# Patient Record
Sex: FEMALE | Race: WHITE | NOT HISPANIC OR LATINO | Employment: PART TIME | URBAN - METROPOLITAN AREA
[De-identification: names, ages, dates, MRNs, and addresses within clinical notes are randomized per-mention and may not be internally consistent; named-entity substitution may affect disease eponyms.]

---

## 2024-05-02 ENCOUNTER — OFFICE VISIT (OUTPATIENT)
Dept: INTERNAL MEDICINE | Facility: CLINIC | Age: 24
End: 2024-05-02
Payer: COMMERCIAL

## 2024-05-02 VITALS
HEART RATE: 112 BPM | TEMPERATURE: 98.3 F | SYSTOLIC BLOOD PRESSURE: 118 MMHG | HEIGHT: 66 IN | OXYGEN SATURATION: 97 % | BODY MASS INDEX: 27.48 KG/M2 | WEIGHT: 171 LBS | DIASTOLIC BLOOD PRESSURE: 80 MMHG

## 2024-05-02 DIAGNOSIS — R10.13 EPIGASTRIC PAIN: ICD-10-CM

## 2024-05-02 DIAGNOSIS — R10.11 RUQ ABDOMINAL PAIN: ICD-10-CM

## 2024-05-02 DIAGNOSIS — R19.7 DIARRHEA, UNSPECIFIED TYPE: ICD-10-CM

## 2024-05-02 DIAGNOSIS — Z72.0 VAPES NICOTINE CONTAINING SUBSTANCE: ICD-10-CM

## 2024-05-02 DIAGNOSIS — Z00.00 HEALTHCARE MAINTENANCE: Primary | ICD-10-CM

## 2024-05-02 DIAGNOSIS — F41.8 ANXIETY WITH DEPRESSION: ICD-10-CM

## 2024-05-02 DIAGNOSIS — R11.2 NAUSEA AND VOMITING, UNSPECIFIED VOMITING TYPE: ICD-10-CM

## 2024-05-02 PROBLEM — F90.2 ATTENTION DEFICIT HYPERACTIVITY DISORDER (ADHD), COMBINED TYPE: Status: ACTIVE | Noted: 2024-05-02

## 2024-05-02 PROBLEM — F41.9 ANXIETY: Status: ACTIVE | Noted: 2024-05-02

## 2024-05-02 PROCEDURE — 99385 PREV VISIT NEW AGE 18-39: CPT | Performed by: NURSE PRACTITIONER

## 2024-05-02 RX ORDER — DEXTROAMPHETAMINE SACCHARATE, AMPHETAMINE ASPARTATE MONOHYDRATE, DEXTROAMPHETAMINE SULFATE AND AMPHETAMINE SULFATE 5; 5; 5; 5 MG/1; MG/1; MG/1; MG/1
CAPSULE, EXTENDED RELEASE ORAL EVERY 12 HOURS SCHEDULED
COMMUNITY

## 2024-05-02 RX ORDER — LURASIDONE HYDROCHLORIDE 20 MG/1
1 TABLET, FILM COATED ORAL DAILY
COMMUNITY
Start: 2024-04-01

## 2024-05-02 RX ORDER — OMEPRAZOLE 40 MG/1
40 CAPSULE, DELAYED RELEASE ORAL DAILY
Qty: 30 CAPSULE | Refills: 1 | Status: SHIPPED | OUTPATIENT
Start: 2024-05-02

## 2024-05-02 RX ORDER — PROPRANOLOL HYDROCHLORIDE 10 MG/1
10 TABLET ORAL 2 TIMES DAILY PRN
Qty: 60 TABLET | Refills: 5 | Status: SHIPPED | OUTPATIENT
Start: 2024-05-02

## 2024-05-02 RX ORDER — ETONOGESTREL AND ETHINYL ESTRADIOL .015; .12 MG/D; MG/D
INSERT, EXTENDED RELEASE VAGINAL
COMMUNITY
Start: 2024-03-10

## 2024-05-02 RX ORDER — CETIRIZINE HYDROCHLORIDE 10 MG/1
1 TABLET ORAL DAILY
COMMUNITY

## 2024-05-02 NOTE — PROGRESS NOTES
Subjective   Bernarda Randall is a 23 y.o. female.     Chief Complaint   Patient presents with    Nausea     Pt is here as a new pt to est care. Pt c/o nausea, vomiting, diarrhea, gassy, bloated on an doff X 2 months.    Vomiting    Abdominal Pain    Anxiety    Depression        History of Present Illness   She is here today as a new patient to establish care and for CPE. She feels like her overall health is pretty good. She used to be very active, on her [employer] dance team. She notes more fatigue over the past few months.   Previous PCP was her pediatrician.     She notes over the past 2 months intermittent nausea, vomiting, diarrhea and abdominal pain and bloating. She notes epigastric burning sensation after eating and when her stomach is empty. She notes episodes of regurgitation shortly after eating and occasional episodes of vomiting, typically in the morning when she first gets up. She notes a lot of nausea in the morning. She denies any abdominal pain with eating, but notes early satiety. She has a difficult time eating a full meal. She notes abdominal bloating and frequent belching. She notes 7-10 episodes of loose stools a day with abdominal and rectal pain.  The pain improves after having a BM. Stool is described as bright yellow.  She notes a lot of fatigue.  She denies any fever, chills, BRBPR, melena, dysphagia, odynophagia, recent travel or antibiotic use. She has been using tums and Mylanta with mild improvement. She is unsure of any trigger foods. Positive family hx of mother with gallbladder disease.      Anxiety- she notes a lot of anxiety and depression recently with an increase in stress. She feels like she manages her anxiety and depression for the most part on her own. She notes anxiety is typically worse in the evening. She notes occasional palpitations and heart racing when anxiety is high. She has been using sleepy time tea and a weighted blanket. This helps some, but she still notes  anxiety. She has previously tried seroquel, lamictal and Zoloft.   ADHD-she is currently on Adderall XR 20 mg BID-TID and Latuda 20 mg nightly.  She is managed by psychiat[facility]va at the [facility] in [place].    GYN-she is followed by [facility]. She is currently on haloette vaginal ring.    The following portions of the patient's history were reviewed and updated as appropriate: allergies, current medications, past family history, past medical history, past social history, past surgical history, and problem list.    Review of Systems   Constitutional:  Positive for appetite change and fatigue. Negative for chills and fever.   HENT: Negative.     Eyes: Negative.    Respiratory: Negative.     Cardiovascular:  Positive for palpitations (occasional heart racing with anxiety). Negative for chest pain and leg swelling.   Gastrointestinal:  Positive for abdominal distention, abdominal pain, diarrhea, nausea, vomiting and GERD. Negative for blood in stool and constipation.   Endocrine: Negative.    Genitourinary: Negative.    Musculoskeletal: Negative.    Skin: Negative.    Allergic/Immunologic: Negative.    Neurological: Negative.    Hematological: Negative.    Psychiatric/Behavioral:  Positive for sleep disturbance and stress. Negative for suicidal ideas and depressed mood. The patient is nervous/anxious.        Objective   Physical Exam  Constitutional:       Appearance: Normal appearance. She is well-developed.   HENT:      Head: Normocephalic and atraumatic.      Right Ear: Hearing, tympanic membrane, ear canal and external ear normal.      Left Ear: Hearing, tympanic membrane, ear canal and external ear normal.      Nose: Nose normal.      Right Sinus: No maxillary sinus tenderness or frontal sinus tenderness.      Left Sinus: No maxillary sinus tenderness or frontal sinus tenderness.      Mouth/Throat:      Lips: Pink.      Mouth: Mucous membranes are moist.      Dentition: Normal dentition.      Tongue: No  lesions.      Pharynx: Oropharynx is clear. Uvula midline.      Tonsils: No tonsillar exudate.   Eyes:      General: Lids are normal.      Extraocular Movements: Extraocular movements intact.      Conjunctiva/sclera: Conjunctivae normal.      Pupils: Pupils are equal, round, and reactive to light.   Neck:      Thyroid: No thyroid mass, thyromegaly or thyroid tenderness.      Vascular: No carotid bruit.      Trachea: Trachea normal.   Cardiovascular:      Rate and Rhythm: Normal rate and regular rhythm.      Pulses: Normal pulses.           Radial pulses are 2+ on the right side and 2+ on the left side.        Popliteal pulses are 2+ on the right side and 2+ on the left side.        Dorsalis pedis pulses are 2+ on the right side and 2+ on the left side.        Posterior tibial pulses are 2+ on the right side and 2+ on the left side.      Heart sounds: S1 normal and S2 normal.   Pulmonary:      Effort: Pulmonary effort is normal.      Breath sounds: Normal breath sounds.   Abdominal:      General: Bowel sounds are normal. There is no distension or abdominal bruit.      Palpations: Abdomen is soft. There is no hepatomegaly or splenomegaly.      Tenderness: There is abdominal tenderness in the right upper quadrant and epigastric area. There is no guarding or rebound. Positive signs include Serrano's sign.      Hernia: No hernia is present.   Musculoskeletal:      Cervical back: Normal range of motion and neck supple.      Right lower leg: No edema.      Left lower leg: No edema.   Lymphadenopathy:      Head:      Right side of head: No submental, submandibular, tonsillar or occipital adenopathy.      Left side of head: No submental, submandibular, tonsillar or occipital adenopathy.      Cervical: No cervical adenopathy.      Upper Body:      Right upper body: No supraclavicular adenopathy.      Left upper body: No supraclavicular adenopathy.      Lower Body: No right inguinal adenopathy. No left inguinal adenopathy.    Skin:     General: Skin is warm and dry.      Findings: No rash.      Nails: There is no clubbing.   Neurological:      General: No focal deficit present.      Mental Status: She is alert and oriented to person, place, and time.      Cranial Nerves: Cranial nerves 2-12 are intact.      Sensory: Sensation is intact.      Motor: Motor function is intact.      Coordination: Coordination is intact.      Gait: Gait is intact.      Deep Tendon Reflexes:      Reflex Scores:       Patellar reflexes are 2+ on the right side and 2+ on the left side.  Psychiatric:         Attention and Perception: Attention and perception normal.         Mood and Affect: Affect normal. Mood is anxious.         Speech: Speech normal.         Behavior: Behavior normal. Behavior is cooperative.         Thought Content: Thought content normal.         Cognition and Memory: Cognition and memory normal.         Judgment: Judgment normal.         Vitals:    05/02/24 1456   BP: 118/80   Pulse: 112   Temp: 98.3 °F (36.8 °C)   SpO2: 97%      Body mass index is 27.6 kg/m².    Assessment & Plan   Problems Addressed this Visit       Anxiety    Relevant Medications    propranolol (INDERAL) 10 MG tablet    Vapes nicotine containing substance     Other Visit Diagnoses       Healthcare maintenance    -  Primary    Relevant Orders    CBC & Differential    Comprehensive Metabolic Panel    Hepatitis C Antibody    Lipid Panel With LDL / HDL Ratio    TSH Rfx On Abnormal To Free T4    Nausea and vomiting, unspecified vomiting type        Relevant Medications    omeprazole (priLOSEC) 40 MG capsule    Other Relevant Orders    Stool Culture (Reference Lab) - Stool, Per Rectum    Ova & Parasite Examination - Stool, Per Rectum    H. Pylori Antigen, Stool - Stool, Per Rectum    CBC & Differential    Comprehensive Metabolic Panel    US Gallbladder    Ambulatory Referral to Gastroenterology    Diarrhea, unspecified type        Relevant Orders    Stool Culture (Reference  Lab) - Stool, Per Rectum    Ova & Parasite Examination - Stool, Per Rectum    Calprotectin, Fecal - Stool, Per Rectum    H. Pylori Antigen, Stool - Stool, Per Rectum    CBC & Differential    Comprehensive Metabolic Panel    US Gallbladder    Ambulatory Referral to Gastroenterology    Epigastric pain        Relevant Medications    omeprazole (priLOSEC) 40 MG capsule    Other Relevant Orders    H. Pylori Antigen, Stool - Stool, Per Rectum    CBC & Differential    Comprehensive Metabolic Panel    Ambulatory Referral to Gastroenterology    RUQ abdominal pain        Relevant Orders    CBC & Differential    Comprehensive Metabolic Panel    US Gallbladder          Diagnoses         Codes Comments    Healthcare maintenance    -  Primary ICD-10-CM: Z00.00  ICD-9-CM: V70.0     Nausea and vomiting, unspecified vomiting type     ICD-10-CM: R11.2  ICD-9-CM: 787.01     Diarrhea, unspecified type     ICD-10-CM: R19.7  ICD-9-CM: 787.91     Epigastric pain     ICD-10-CM: R10.13  ICD-9-CM: 789.06     RUQ abdominal pain     ICD-10-CM: R10.11  ICD-9-CM: 789.01     Anxiety with depression     ICD-10-CM: F41.8  ICD-9-CM: 300.4     Vapes nicotine containing substance     ICD-10-CM: Z72.0  ICD-9-CM: 305.1         Patient screened positive for depression based on a PHQ-9 score of 16 on 5/2/2024. Follow-up recommendations include: Referral to Mental Health specialist.    1.  Preventative counseling-encouraged her to continue to work on healthy, balanced eating and return to regular exercise as tolerated.  Ensure adequate dietary take calcium vitamin D.  2.  Nausea and vomiting/diarrhea/right upper quadrant and epigastric pain-check stool studies today including stool culture, O&P and fecal calprotectin.  Check lab work on Monday.  Gallbladder ultrasound ordered for further evaluation. Start omeprazole 40 mg daily.  Discussed reflux precautions.  Encourage cessation of vaping. Referral placed to GI.  3.  Anxiety with depression-will try  propranolol 10 mg twice daily as needed for anxiety.  Encouraged relaxation techniques.  Follow-up in 6 weeks for medication check.  Encouraged her to follow-up with psychiatry as scheduled.  4.  ADHD-managed per psychiatry.  Encouraged her to follow-up as scheduled.  Monitor heart rate closely with stimulant medication.  “Discussed risks/benefits to vaccination, reviewed components of the vaccine, discussed VIS, discussed informed consent, informed consent obtained. Patient/Parent was allowed to accept or refuse vaccine. Questions answered to satisfactory state of patient/Parent. We reviewed typical age appropriate and seasonally appropriate vaccinations. Reviewed immunization history and updated state vaccination form as needed. Patient was counseled on HPV  Tdap    Encouraged routine dental and eye exams.  GYN-records requested today.  Encouraged sunscreen use outside    Fasting labs on Monday, will call with results.  Follow-up in 6 weeks for anxiety.

## 2024-05-03 ENCOUNTER — TELEPHONE (OUTPATIENT)
Dept: INTERNAL MEDICINE | Facility: CLINIC | Age: 24
End: 2024-05-03
Payer: COMMERCIAL

## 2024-05-03 NOTE — TELEPHONE ENCOUNTER
----- Message from Amy LUND sent at 5/2/2024  3:45 PM EDT -----  Can we call [facility] to request her most recent Pap smear record?

## 2024-05-08 ENCOUNTER — HOSPITAL ENCOUNTER (OUTPATIENT)
Facility: HOSPITAL | Age: 24
Discharge: HOME | End: 2024-05-08
Admitting: NURSE PRACTITIONER
Payer: COMMERCIAL

## 2024-05-08 DIAGNOSIS — R11.2 NAUSEA AND VOMITING, UNSPECIFIED VOMITING TYPE: ICD-10-CM

## 2024-05-08 DIAGNOSIS — R10.11 RUQ ABDOMINAL PAIN: ICD-10-CM

## 2024-05-08 DIAGNOSIS — R11.2 NAUSEA AND VOMITING, UNSPECIFIED VOMITING TYPE: Primary | ICD-10-CM

## 2024-05-08 DIAGNOSIS — E80.6 HYPERBILIRUBINEMIA: ICD-10-CM

## 2024-05-08 DIAGNOSIS — R19.7 DIARRHEA, UNSPECIFIED TYPE: ICD-10-CM

## 2024-05-08 DIAGNOSIS — R17 ELEVATED BILIRUBIN: ICD-10-CM

## 2024-05-08 DIAGNOSIS — R74.8 ELEVATED LIVER ENZYMES: ICD-10-CM

## 2024-05-08 DIAGNOSIS — R74.01 TRANSAMINITIS: Primary | ICD-10-CM

## 2024-05-08 DIAGNOSIS — K76.0 HEPATIC STEATOSIS: ICD-10-CM

## 2024-05-08 PROCEDURE — 76705 ECHO EXAM OF ABDOMEN: CPT

## 2024-05-15 ENCOUNTER — OFFICE VISIT (OUTPATIENT)
Dept: GASTROENTEROLOGY | Facility: CLINIC | Age: 24
End: 2024-05-15
Payer: COMMERCIAL

## 2024-05-15 VITALS
BODY MASS INDEX: 27.48 KG/M2 | HEART RATE: 101 BPM | OXYGEN SATURATION: 100 % | HEIGHT: 66 IN | WEIGHT: 171 LBS | SYSTOLIC BLOOD PRESSURE: 135 MMHG | DIASTOLIC BLOOD PRESSURE: 80 MMHG | TEMPERATURE: 96.4 F

## 2024-05-15 DIAGNOSIS — R74.8 ELEVATED LIVER ENZYMES: ICD-10-CM

## 2024-05-15 DIAGNOSIS — R10.13 EPIGASTRIC PAIN: Primary | ICD-10-CM

## 2024-05-15 DIAGNOSIS — R19.7 DIARRHEA, UNSPECIFIED TYPE: ICD-10-CM

## 2024-05-15 DIAGNOSIS — R11.0 NAUSEA: ICD-10-CM

## 2024-05-15 RX ORDER — DIPHENHYDRAMINE HCL 25 MG
25 TABLET ORAL NIGHTLY
COMMUNITY

## 2024-05-15 NOTE — PROGRESS NOTES
Chief Complaint   Patient presents with    Nausea    Diarrhea    Abdominal Pain           History of Present Illness    Patient is a 24 y.o. who presents to the office as a new patient for further evaluation of nausea, vomiting, diarrhea after referral received from primary care provider AUGUSTINE Arshad. Patient has a significant past medical history of electronic cigarette use, anxiety and ADHD    She denies prior EGD or colonoscopy evaluation.    States that over the past few months she has experienced worsening upper abdominal pain described as a burning sensation and diarrhea.  States that she started omeprazole as prescribed by primary care provider with subsequent improvement in epigastric abdominal pain and resolution of diarrhea.    Current bowel habits are described as occurring twice per day compared to up to 10 loose stools per day prior to starting omeprazole.  Denies presence of melena or hematochezia.  Denies nocturnal bowel movements.    Prior to 5/2 assessment of LFTs with PCP she reports taking increased amounts of Midol for lower abdominal cramping associated with menses.  Denies previous knowledge of liver changes.  Reports unknown liver disease in grandfather.     Denies use of alcohol however does reports increased use as a teenager.    Patient has positive family history of colon polyps in mother and father    Patient denies known family history of colon cancer, or IBD.       Result Review :       Office Visit with Gloria Cuellar APRN (05/02/2024)   Comprehensive Metabolic Panel (05/06/2024 11:02)   Hepatic Function Panel (05/10/2024 13:54) - LFTs improved  Hepatitis Panel, Acute (05/10/2024 13:54)negative  LIGIA Direct Reflex to 11 Biomarker (05/10/2024 13:54)negative  Ferritin (05/10/2024 13:54)  Anti-Smooth Muscle Antibody Titer (05/10/2024 13:54)negative  Gamma GT (05/10/2024 13:54)  Mitochondrial Antibodies, M2 (05/10/2024 13:54)negative  Alpha - 1 - Antitrypsin (05/10/2024 13:54)-  "negative  US Gallbladder (05/08/2024 13:10)   Hepatic steatosis otherwise negative for evidence of acute cholecystitis or biliary ductal dilation  CT Abdomen & Pelvis W IV Contrast (09/01/2022 19:49)  CT Abdomen & Pelvis W IV Contrast (10/14/2022 22:18)  Lipid Panel With LDL / HDL Ratio (05/06/2024 11:02) -  Triglycerides 234  Vital Signs:   /80   Pulse 101   Temp 96.4 °F (35.8 °C)   Ht 167.6 cm (65.98\")   Wt 77.6 kg (171 lb)   SpO2 100%   BMI 27.61 kg/m²     Body mass index is 27.61 kg/m².     Physical Exam  Vitals reviewed.   Constitutional:       General: She is not in acute distress.     Appearance: Normal appearance. She is well-developed. She is not diaphoretic.   HENT:      Head: Normocephalic and atraumatic.   Eyes:      General: No scleral icterus.  Cardiovascular:      Rate and Rhythm: Normal rate and regular rhythm.   Pulmonary:      Effort: Pulmonary effort is normal. No respiratory distress.      Breath sounds: Normal breath sounds.   Abdominal:      General: Bowel sounds are normal. There is no distension.      Palpations: Abdomen is soft. There is no mass.      Tenderness: There is no abdominal tenderness. There is no guarding or rebound.      Hernia: No hernia is present.   Skin:     General: Skin is warm and dry.      Coloration: Skin is not jaundiced.   Neurological:      Mental Status: She is alert and oriented to person, place, and time.   Psychiatric:         Mood and Affect: Mood normal.         Behavior: Behavior normal.         Thought Content: Thought content normal.         Judgment: Judgment normal.           Assessment and Plan    Diagnoses and all orders for this visit:    1. Epigastric pain (Primary)    2. Nausea    3. Diarrhea, unspecified type    4. Elevated liver enzymes  -     Hepatic Function Panel; Future           Discussion:    Patient is a pleasant 24-year-old female who presents today for further evaluation of epigastric abdominal pain, diarrhea, and elevated liver " enzymes.  Discussed future assessment options including proceeding with EGD evaluation to assess for evidence of EGD, gastritis, PUD, or pyloric stenosis versus continuing on omeprazole x 12 weeks with plans to begin every other day dosing x 14 days and if symptoms return at this time would recommend proceeding with EGD evaluation.    Patient would like to proceed with option to and continue omeprazole until August and if symptoms return after discontinuing therapy she will contact her office to proceed with EGD evaluation at that time.    For elevated liver enzymes, these were improved at time of subsequent assessment.  We will recheck in 4 weeks and if normalized no further assessment is required.  However if they remain elevated would recommend proceeding with MRCP assessment to look for underlying etiology.    I have also provided recommendations for fatty liver changes noted on ultrasound assessment and encouraged her to avoid any nonprescribed supplements, limit Tylenol and NSAID use, and alcohol to reduce risk of liver injury.    Patient is agreeable to the outlined above treatment plan.  Verbalizes understanding and will contact office for any new or worsening concerns.  All questions answered and support provided.        Patient Instructions   Recheck liver enzymes around Yocasta 10, 2024, if your liver enzymes remain elevated at this time would recommend completing an MRI imaging study of your liver to look for an underlying cause.     Please continue to take Tylenol less than 2 g/day, avoid nonprescribed supplements, and limit NSAID use when possible such as Excedrin    For GERD:    Follow antireflux precautions:    Continue taking the omeprazole 40 mg once daily prior to first meal of the day for a total of 12 weeks.  On or around August 2, 2024 I would like for you to begin taking your omeprazole every other day x 14 days.    If your upper abdominal pain and/or nausea returns I would like for you to let  our office know so that we can schedule you for a procedure called EGD where week will take a camera while you are asleep.  During the EGD we will look for any visible cause to your symptoms such as inflammation in the stomach.  Avoiding eating within 3 to 4 hours of bedtime.    Avoid foods that can trigger symptoms which may include:  citrus fruits  spicy foods,  Tomatoes  Red sauces   Chocolate  coffee/tea  caffeinated or carbonated beverages  alcohol    For Fatty Liver Changes visible on ultrasound:     We recommend lifestyle modifications including Mediterranean diet.  Research has proven that reducing your body weight by at least 10% over 12 months has led to significant improvements in liver health  If safe to do so we recommend exercising for at least 150 minutes/week or increase your activity level by greater than 60 minutes/week  At this time there are unfortunately no proven medications to help improve fatty liver changes however managing your other health concerns such as lowering high cholesterol and blood sugars can reduce risk of future liver injury and prevent progression of liver disease.  We also recommend continued monitoring of liver function test with either our office or with primary care provider every 3 to 6 months for monitoring            EMR Dragon/Transcription Disclaimer:  This document has been Dictated utilizing Dragon dictation.

## 2024-05-15 NOTE — PATIENT INSTRUCTIONS
Recheck liver enzymes around Yocasta 10, 2024, if your liver enzymes remain elevated at this time would recommend completing an MRI imaging study of your liver to look for an underlying cause.     Please continue to take Tylenol less than 2 g/day, avoid nonprescribed supplements, and limit NSAID use when possible such as Excedrin    For GERD:    Follow antireflux precautions:    Continue taking the omeprazole 40 mg once daily prior to first meal of the day for a total of 12 weeks.  On or around August 2, 2024 I would like for you to begin taking your omeprazole every other day x 14 days.    If your upper abdominal pain and/or nausea returns I would like for you to let our office know so that we can schedule you for a procedure called EGD where week will take a camera while you are asleep.  During the EGD we will look for any visible cause to your symptoms such as inflammation in the stomach.  Avoiding eating within 3 to 4 hours of bedtime.    Avoid foods that can trigger symptoms which may include:  citrus fruits  spicy foods,  Tomatoes  Red sauces   Chocolate  coffee/tea  caffeinated or carbonated beverages  alcohol    For Fatty Liver Changes visible on ultrasound:     We recommend lifestyle modifications including Mediterranean diet.  Research has proven that reducing your body weight by at least 10% over 12 months has led to significant improvements in liver health  If safe to do so we recommend exercising for at least 150 minutes/week or increase your activity level by greater than 60 minutes/week  At this time there are unfortunately no proven medications to help improve fatty liver changes however managing your other health concerns such as lowering high cholesterol and blood sugars can reduce risk of future liver injury and prevent progression of liver disease.  We also recommend continued monitoring of liver function test with either our office or with primary care provider every 3 to 6 months for  monitoring

## 2024-05-18 LAB
BACTERIA SPEC CULT: NORMAL
BACTERIA SPEC CULT: NORMAL
CALPROTECTIN STL-MCNT: 141 UG/G (ref 0–120)
CAMPYLOBACTER STL CULT: NORMAL
E COLI SXT STL QL IA: NEGATIVE
H PYLORI AG STL QL IA: NEGATIVE
O+P SPEC MICRO: NORMAL
O+P STL CONC: NORMAL
SALM + SHIG STL CULT: NORMAL

## 2024-05-24 ENCOUNTER — PREP FOR SURGERY (OUTPATIENT)
Dept: SURGERY | Facility: SURGERY CENTER | Age: 24
End: 2024-05-24
Payer: COMMERCIAL

## 2024-05-24 DIAGNOSIS — R11.0 NAUSEA: ICD-10-CM

## 2024-05-24 DIAGNOSIS — R10.11 RUQ ABDOMINAL PAIN: ICD-10-CM

## 2024-05-24 DIAGNOSIS — R10.13 EPIGASTRIC PAIN: Primary | ICD-10-CM

## 2024-05-24 DIAGNOSIS — R19.7 DIARRHEA, UNSPECIFIED TYPE: ICD-10-CM

## 2024-05-24 RX ORDER — SODIUM CHLORIDE 0.9 % (FLUSH) 0.9 %
10 SYRINGE (ML) INJECTION AS NEEDED
OUTPATIENT
Start: 2024-05-24

## 2024-05-24 RX ORDER — SODIUM CHLORIDE, SODIUM LACTATE, POTASSIUM CHLORIDE, CALCIUM CHLORIDE 600; 310; 30; 20 MG/100ML; MG/100ML; MG/100ML; MG/100ML
30 INJECTION, SOLUTION INTRAVENOUS CONTINUOUS PRN
OUTPATIENT
Start: 2024-05-24

## 2024-05-24 RX ORDER — SODIUM CHLORIDE 0.9 % (FLUSH) 0.9 %
3 SYRINGE (ML) INJECTION EVERY 12 HOURS SCHEDULED
OUTPATIENT
Start: 2024-05-24

## 2024-07-17 DIAGNOSIS — R10.13 EPIGASTRIC PAIN: ICD-10-CM

## 2024-07-17 DIAGNOSIS — R11.2 NAUSEA AND VOMITING, UNSPECIFIED VOMITING TYPE: ICD-10-CM

## 2024-07-17 RX ORDER — OMEPRAZOLE 40 MG/1
40 CAPSULE, DELAYED RELEASE ORAL DAILY
Qty: 30 CAPSULE | Refills: 1 | Status: SHIPPED | OUTPATIENT
Start: 2024-07-17

## 2024-07-28 ENCOUNTER — ON CAMPUS - OUTPATIENT (OUTPATIENT)
Dept: URBAN - METROPOLITAN AREA HOSPITAL 114 | Facility: HOSPITAL | Age: 24
End: 2024-07-28
Payer: COMMERCIAL

## 2024-07-28 ENCOUNTER — HOSPITAL ENCOUNTER (OUTPATIENT)
Facility: HOSPITAL | Age: 24
Setting detail: OBSERVATION
Discharge: HOME | End: 2024-07-30
Attending: EMERGENCY MEDICINE | Admitting: INTERNAL MEDICINE
Payer: COMMERCIAL

## 2024-07-28 ENCOUNTER — APPOINTMENT (OUTPATIENT)
Dept: CT IMAGING | Facility: HOSPITAL | Age: 24
End: 2024-07-28
Payer: COMMERCIAL

## 2024-07-28 DIAGNOSIS — K92.2 UPPER GI BLEED: Primary | ICD-10-CM

## 2024-07-28 DIAGNOSIS — K70.10 ALCOHOLIC HEPATITIS WITHOUT ASCITES: ICD-10-CM

## 2024-07-28 DIAGNOSIS — F10.10 ALCOHOL ABUSE: ICD-10-CM

## 2024-07-28 DIAGNOSIS — K92.0 HEMATEMESIS: ICD-10-CM

## 2024-07-28 DIAGNOSIS — R10.13 EPIGASTRIC PAIN: ICD-10-CM

## 2024-07-28 PROBLEM — R79.89 ABNORMAL LFTS: Status: ACTIVE | Noted: 2024-07-28

## 2024-07-28 PROBLEM — K76.0 FATTY LIVER: Status: ACTIVE | Noted: 2024-07-28

## 2024-07-28 PROBLEM — K29.20 ALCOHOLIC GASTRITIS: Status: ACTIVE | Noted: 2024-07-28

## 2024-07-28 LAB
ALBUMIN SERPL-MCNC: 3.8 G/DL (ref 3.5–5.2)
ALBUMIN/GLOB SERPL: 1.5 G/DL
ALP SERPL-CCNC: 97 U/L (ref 39–117)
ALT SERPL W P-5'-P-CCNC: 81 U/L (ref 1–33)
ANION GAP SERPL CALCULATED.3IONS-SCNC: 11.5 MMOL/L (ref 5–15)
AST SERPL-CCNC: 193 U/L (ref 1–32)
BASOPHILS # BLD AUTO: 0.03 10*3/MM3 (ref 0–0.2)
BASOPHILS NFR BLD AUTO: 0.5 % (ref 0–1.5)
BILIRUB SERPL-MCNC: 1.8 MG/DL (ref 0–1.2)
BILIRUB UR QL STRIP: NEGATIVE
BUN SERPL-MCNC: 2 MG/DL (ref 6–20)
BUN/CREAT SERPL: 3.2 (ref 7–25)
CALCIUM SPEC-SCNC: 8.6 MG/DL (ref 8.6–10.5)
CHLORIDE SERPL-SCNC: 96 MMOL/L (ref 98–107)
CLARITY UR: ABNORMAL
CO2 SERPL-SCNC: 25.5 MMOL/L (ref 22–29)
COLOR UR: YELLOW
CREAT SERPL-MCNC: 0.63 MG/DL (ref 0.57–1)
D-LACTATE SERPL-SCNC: 1.9 MMOL/L (ref 0.5–2)
D-LACTATE SERPL-SCNC: 2.2 MMOL/L (ref 0.5–2)
D-LACTATE SERPL-SCNC: 4.5 MMOL/L (ref 0.5–2)
DEPRECATED RDW RBC AUTO: 62.2 FL (ref 37–54)
EGFRCR SERPLBLD CKD-EPI 2021: 128 ML/MIN/1.73
EOSINOPHIL # BLD AUTO: 0.01 10*3/MM3 (ref 0–0.4)
EOSINOPHIL NFR BLD AUTO: 0.2 % (ref 0.3–6.2)
ERYTHROCYTE [DISTWIDTH] IN BLOOD BY AUTOMATED COUNT: 16 % (ref 12.3–15.4)
ETHANOL BLD-MCNC: 68 MG/DL (ref 0–10)
ETHANOL UR QL: 0.07 %
GLOBULIN UR ELPH-MCNC: 2.5 GM/DL
GLUCOSE SERPL-MCNC: 99 MG/DL (ref 65–99)
GLUCOSE UR STRIP-MCNC: NEGATIVE MG/DL
HCG SERPL QL: NEGATIVE
HCT VFR BLD AUTO: 38.2 % (ref 34–46.6)
HCT VFR BLD AUTO: 44.3 % (ref 34–46.6)
HGB BLD-MCNC: 13.1 G/DL (ref 12–15.9)
HGB BLD-MCNC: 15.1 G/DL (ref 12–15.9)
HGB UR QL STRIP.AUTO: NEGATIVE
IMM GRANULOCYTES # BLD AUTO: 0.01 10*3/MM3 (ref 0–0.05)
IMM GRANULOCYTES NFR BLD AUTO: 0.2 % (ref 0–0.5)
KETONES UR QL STRIP: NEGATIVE
LEUKOCYTE ESTERASE UR QL STRIP.AUTO: NEGATIVE
LIPASE SERPL-CCNC: 141 U/L (ref 13–60)
LYMPHOCYTES # BLD AUTO: 0.74 10*3/MM3 (ref 0.7–3.1)
LYMPHOCYTES NFR BLD AUTO: 12.1 % (ref 19.6–45.3)
MCH RBC QN AUTO: 35.4 PG (ref 26.6–33)
MCHC RBC AUTO-ENTMCNC: 34.1 G/DL (ref 31.5–35.7)
MCV RBC AUTO: 103.7 FL (ref 79–97)
MONOCYTES # BLD AUTO: 0.56 10*3/MM3 (ref 0.1–0.9)
MONOCYTES NFR BLD AUTO: 9.2 % (ref 5–12)
NEUTROPHILS NFR BLD AUTO: 4.76 10*3/MM3 (ref 1.7–7)
NEUTROPHILS NFR BLD AUTO: 77.8 % (ref 42.7–76)
NITRITE UR QL STRIP: NEGATIVE
PH UR STRIP.AUTO: 6 [PH] (ref 5–8)
PLATELET # BLD AUTO: 276 10*3/MM3 (ref 140–450)
PMV BLD AUTO: 9.6 FL (ref 6–12)
POTASSIUM SERPL-SCNC: 4.3 MMOL/L (ref 3.5–5.2)
PROT SERPL-MCNC: 6.3 G/DL (ref 6–8.5)
PROT UR QL STRIP: NEGATIVE
RBC # BLD AUTO: 4.27 10*6/MM3 (ref 3.77–5.28)
SODIUM SERPL-SCNC: 133 MMOL/L (ref 136–145)
SP GR UR STRIP: <=1.005 (ref 1–1.03)
UROBILINOGEN UR QL STRIP: ABNORMAL
WBC NRBC COR # BLD AUTO: 6.11 10*3/MM3 (ref 3.4–10.8)

## 2024-07-28 PROCEDURE — 25510000001 IOPAMIDOL PER 1 ML: Performed by: EMERGENCY MEDICINE

## 2024-07-28 PROCEDURE — 25810000003 SODIUM CHLORIDE 0.9 % SOLUTION: Performed by: INTERNAL MEDICINE

## 2024-07-28 PROCEDURE — 25010000002 DROPERIDOL PER 5 MG: Performed by: PHYSICIAN ASSISTANT

## 2024-07-28 PROCEDURE — 96361 HYDRATE IV INFUSION ADD-ON: CPT

## 2024-07-28 PROCEDURE — 25010000002 THIAMINE HCL 200 MG/2ML SOLUTION: Performed by: INTERNAL MEDICINE

## 2024-07-28 PROCEDURE — 99285 EMERGENCY DEPT VISIT HI MDM: CPT | Performed by: PHYSICIAN ASSISTANT

## 2024-07-28 PROCEDURE — 25010000002 ONDANSETRON PER 1 MG: Performed by: PHYSICIAN ASSISTANT

## 2024-07-28 PROCEDURE — G0378 HOSPITAL OBSERVATION PER HR: HCPCS

## 2024-07-28 PROCEDURE — 83690 ASSAY OF LIPASE: CPT | Performed by: PHYSICIAN ASSISTANT

## 2024-07-28 PROCEDURE — 99285 EMERGENCY DEPT VISIT HI MDM: CPT

## 2024-07-28 PROCEDURE — 83605 ASSAY OF LACTIC ACID: CPT | Performed by: PHYSICIAN ASSISTANT

## 2024-07-28 PROCEDURE — 80053 COMPREHEN METABOLIC PANEL: CPT | Performed by: PHYSICIAN ASSISTANT

## 2024-07-28 PROCEDURE — 96375 TX/PRO/DX INJ NEW DRUG ADDON: CPT

## 2024-07-28 PROCEDURE — 36415 COLL VENOUS BLD VENIPUNCTURE: CPT

## 2024-07-28 PROCEDURE — 84703 CHORIONIC GONADOTROPIN ASSAY: CPT | Performed by: PHYSICIAN ASSISTANT

## 2024-07-28 PROCEDURE — 25010000002 MORPHINE PER 10 MG: Performed by: PHYSICIAN ASSISTANT

## 2024-07-28 PROCEDURE — 25810000003 SODIUM CHLORIDE 0.9 % SOLUTION: Performed by: PHYSICIAN ASSISTANT

## 2024-07-28 PROCEDURE — 82077 ASSAY SPEC XCP UR&BREATH IA: CPT | Performed by: PHYSICIAN ASSISTANT

## 2024-07-28 PROCEDURE — 96374 THER/PROPH/DIAG INJ IV PUSH: CPT

## 2024-07-28 PROCEDURE — 85025 COMPLETE CBC W/AUTO DIFF WBC: CPT | Performed by: PHYSICIAN ASSISTANT

## 2024-07-28 PROCEDURE — 85014 HEMATOCRIT: CPT | Performed by: INTERNAL MEDICINE

## 2024-07-28 PROCEDURE — 99222 1ST HOSP IP/OBS MODERATE 55: CPT | Performed by: INTERNAL MEDICINE

## 2024-07-28 PROCEDURE — 96376 TX/PRO/DX INJ SAME DRUG ADON: CPT

## 2024-07-28 PROCEDURE — 74177 CT ABD & PELVIS W/CONTRAST: CPT

## 2024-07-28 PROCEDURE — 85018 HEMOGLOBIN: CPT | Performed by: INTERNAL MEDICINE

## 2024-07-28 PROCEDURE — 81003 URINALYSIS AUTO W/O SCOPE: CPT | Performed by: EMERGENCY MEDICINE

## 2024-07-28 RX ORDER — LORAZEPAM 1 MG/1
2 TABLET ORAL EVERY 6 HOURS
Status: COMPLETED | OUTPATIENT
Start: 2024-07-28 | End: 2024-07-29

## 2024-07-28 RX ORDER — ONDANSETRON 2 MG/ML
4 INJECTION INTRAMUSCULAR; INTRAVENOUS EVERY 6 HOURS PRN
Status: DISCONTINUED | OUTPATIENT
Start: 2024-07-28 | End: 2024-07-30 | Stop reason: HOSPADM

## 2024-07-28 RX ORDER — THIAMINE HYDROCHLORIDE 100 MG/ML
100 INJECTION, SOLUTION INTRAMUSCULAR; INTRAVENOUS ONCE
Status: COMPLETED | OUTPATIENT
Start: 2024-07-28 | End: 2024-07-28

## 2024-07-28 RX ORDER — LORAZEPAM 2 MG/ML
1 INJECTION INTRAMUSCULAR
Status: DISCONTINUED | OUTPATIENT
Start: 2024-07-28 | End: 2024-07-30 | Stop reason: HOSPADM

## 2024-07-28 RX ORDER — SODIUM CHLORIDE 0.9 % (FLUSH) 0.9 %
10 SYRINGE (ML) INJECTION AS NEEDED
Status: DISCONTINUED | OUTPATIENT
Start: 2024-07-28 | End: 2024-07-30

## 2024-07-28 RX ORDER — LORAZEPAM 2 MG/ML
2 INJECTION INTRAMUSCULAR
Status: DISCONTINUED | OUTPATIENT
Start: 2024-07-28 | End: 2024-07-30 | Stop reason: HOSPADM

## 2024-07-28 RX ORDER — PANTOPRAZOLE SODIUM 40 MG/10ML
40 INJECTION, POWDER, LYOPHILIZED, FOR SOLUTION INTRAVENOUS EVERY 12 HOURS SCHEDULED
Status: DISCONTINUED | OUTPATIENT
Start: 2024-07-28 | End: 2024-07-30

## 2024-07-28 RX ORDER — MORPHINE SULFATE 2 MG/ML
4 INJECTION, SOLUTION INTRAMUSCULAR; INTRAVENOUS EVERY 4 HOURS PRN
Status: DISCONTINUED | OUTPATIENT
Start: 2024-07-28 | End: 2024-07-30 | Stop reason: HOSPADM

## 2024-07-28 RX ORDER — SODIUM CHLORIDE 0.9 % (FLUSH) 0.9 %
10 SYRINGE (ML) INJECTION EVERY 12 HOURS SCHEDULED
Status: DISCONTINUED | OUTPATIENT
Start: 2024-07-28 | End: 2024-07-30

## 2024-07-28 RX ORDER — BISACODYL 10 MG
10 SUPPOSITORY, RECTAL RECTAL DAILY PRN
Status: DISCONTINUED | OUTPATIENT
Start: 2024-07-28 | End: 2024-07-30 | Stop reason: HOSPADM

## 2024-07-28 RX ORDER — DROPERIDOL 2.5 MG/ML
2.5 INJECTION, SOLUTION INTRAMUSCULAR; INTRAVENOUS ONCE
Status: COMPLETED | OUTPATIENT
Start: 2024-07-28 | End: 2024-07-28

## 2024-07-28 RX ORDER — SODIUM CHLORIDE 9 MG/ML
125 INJECTION, SOLUTION INTRAVENOUS CONTINUOUS
Status: DISCONTINUED | OUTPATIENT
Start: 2024-07-28 | End: 2024-07-30

## 2024-07-28 RX ORDER — POLYETHYLENE GLYCOL 3350 17 G/17G
17 POWDER, FOR SOLUTION ORAL DAILY PRN
Status: DISCONTINUED | OUTPATIENT
Start: 2024-07-28 | End: 2024-07-30 | Stop reason: HOSPADM

## 2024-07-28 RX ORDER — THIAMINE HYDROCHLORIDE 100 MG/ML
200 INJECTION, SOLUTION INTRAMUSCULAR; INTRAVENOUS EVERY 8 HOURS SCHEDULED
Status: DISCONTINUED | OUTPATIENT
Start: 2024-07-28 | End: 2024-07-30 | Stop reason: HOSPADM

## 2024-07-28 RX ORDER — AMOXICILLIN 250 MG
2 CAPSULE ORAL 2 TIMES DAILY PRN
Status: DISCONTINUED | OUTPATIENT
Start: 2024-07-28 | End: 2024-07-30 | Stop reason: HOSPADM

## 2024-07-28 RX ORDER — LORAZEPAM 1 MG/1
2 TABLET ORAL
Status: DISCONTINUED | OUTPATIENT
Start: 2024-07-28 | End: 2024-07-30 | Stop reason: HOSPADM

## 2024-07-28 RX ORDER — NITROGLYCERIN 0.4 MG/1
0.4 TABLET SUBLINGUAL
Status: DISCONTINUED | OUTPATIENT
Start: 2024-07-28 | End: 2024-07-30 | Stop reason: HOSPADM

## 2024-07-28 RX ORDER — LORAZEPAM 1 MG/1
1 TABLET ORAL EVERY 6 HOURS
Status: COMPLETED | OUTPATIENT
Start: 2024-07-29 | End: 2024-07-30

## 2024-07-28 RX ORDER — LORAZEPAM 1 MG/1
1 TABLET ORAL DAILY
Status: DISCONTINUED | OUTPATIENT
Start: 2024-08-01 | End: 2024-07-30 | Stop reason: HOSPADM

## 2024-07-28 RX ORDER — LORAZEPAM 1 MG/1
1 TABLET ORAL
Status: DISCONTINUED | OUTPATIENT
Start: 2024-07-28 | End: 2024-07-30 | Stop reason: HOSPADM

## 2024-07-28 RX ORDER — DEXTROAMPHETAMINE SACCHARATE, AMPHETAMINE ASPARTATE MONOHYDRATE, DEXTROAMPHETAMINE SULFATE AND AMPHETAMINE SULFATE 2.5; 2.5; 2.5; 2.5 MG/1; MG/1; MG/1; MG/1
20 CAPSULE, EXTENDED RELEASE ORAL EVERY 12 HOURS
Status: DISCONTINUED | OUTPATIENT
Start: 2024-07-28 | End: 2024-07-30 | Stop reason: HOSPADM

## 2024-07-28 RX ORDER — MORPHINE SULFATE 4 MG/ML
4 INJECTION, SOLUTION INTRAMUSCULAR; INTRAVENOUS ONCE
Status: COMPLETED | OUTPATIENT
Start: 2024-07-28 | End: 2024-07-28

## 2024-07-28 RX ORDER — NICOTINE 21 MG/24HR
1 PATCH, TRANSDERMAL 24 HOURS TRANSDERMAL EVERY 24 HOURS
Status: DISCONTINUED | OUTPATIENT
Start: 2024-07-28 | End: 2024-07-30 | Stop reason: HOSPADM

## 2024-07-28 RX ORDER — BISACODYL 5 MG/1
5 TABLET, DELAYED RELEASE ORAL DAILY PRN
Status: DISCONTINUED | OUTPATIENT
Start: 2024-07-28 | End: 2024-07-30 | Stop reason: HOSPADM

## 2024-07-28 RX ORDER — PANTOPRAZOLE SODIUM 40 MG/10ML
80 INJECTION, POWDER, LYOPHILIZED, FOR SOLUTION INTRAVENOUS ONCE
Status: COMPLETED | OUTPATIENT
Start: 2024-07-28 | End: 2024-07-28

## 2024-07-28 RX ORDER — LORAZEPAM 2 MG/ML
1 INJECTION INTRAMUSCULAR ONCE
Status: DISCONTINUED | OUTPATIENT
Start: 2024-07-28 | End: 2024-07-28

## 2024-07-28 RX ORDER — LORAZEPAM 1 MG/1
1 TABLET ORAL EVERY 12 HOURS SCHEDULED
Status: DISCONTINUED | OUTPATIENT
Start: 2024-07-30 | End: 2024-07-30 | Stop reason: HOSPADM

## 2024-07-28 RX ORDER — ONDANSETRON 2 MG/ML
4 INJECTION INTRAMUSCULAR; INTRAVENOUS ONCE
Status: COMPLETED | OUTPATIENT
Start: 2024-07-28 | End: 2024-07-28

## 2024-07-28 RX ORDER — PROPRANOLOL HYDROCHLORIDE 10 MG/1
10 TABLET ORAL 2 TIMES DAILY PRN
Status: DISCONTINUED | OUTPATIENT
Start: 2024-07-28 | End: 2024-07-29

## 2024-07-28 RX ORDER — FOLIC ACID 1 MG/1
1 TABLET ORAL DAILY
Status: DISCONTINUED | OUTPATIENT
Start: 2024-07-28 | End: 2024-07-30 | Stop reason: HOSPADM

## 2024-07-28 RX ADMIN — SODIUM CHLORIDE 1000 ML: 9 INJECTION, SOLUTION INTRAVENOUS at 11:24

## 2024-07-28 RX ADMIN — PANTOPRAZOLE SODIUM 40 MG: 40 INJECTION, POWDER, FOR SOLUTION INTRAVENOUS at 21:02

## 2024-07-28 RX ADMIN — LORAZEPAM 2 MG: 1 TABLET ORAL at 17:24

## 2024-07-28 RX ADMIN — SODIUM CHLORIDE 1000 ML: 9 INJECTION, SOLUTION INTRAVENOUS at 13:24

## 2024-07-28 RX ADMIN — DROPERIDOL 2.5 MG: 2.5 INJECTION, SOLUTION INTRAMUSCULAR; INTRAVENOUS at 13:24

## 2024-07-28 RX ADMIN — FOLIC ACID 1 MG: 1 TABLET ORAL at 21:03

## 2024-07-28 RX ADMIN — Medication 10 ML: at 21:02

## 2024-07-28 RX ADMIN — THIAMINE HYDROCHLORIDE 100 MG: 100 INJECTION, SOLUTION INTRAMUSCULAR; INTRAVENOUS at 21:01

## 2024-07-28 RX ADMIN — ONDANSETRON 4 MG: 2 INJECTION INTRAMUSCULAR; INTRAVENOUS at 11:17

## 2024-07-28 RX ADMIN — MORPHINE SULFATE 4 MG: 4 INJECTION, SOLUTION INTRAMUSCULAR; INTRAVENOUS at 11:20

## 2024-07-28 RX ADMIN — SODIUM CHLORIDE 125 ML/HR: 9 INJECTION, SOLUTION INTRAVENOUS at 17:25

## 2024-07-28 RX ADMIN — PANTOPRAZOLE SODIUM 80 MG: 40 INJECTION, POWDER, FOR SOLUTION INTRAVENOUS at 11:13

## 2024-07-28 RX ADMIN — IOPAMIDOL 85 ML: 755 INJECTION, SOLUTION INTRAVENOUS at 11:56

## 2024-07-28 NOTE — CONSULTS
[facility]   Consult Note    Patient Name: Bernarda Randall  : 2000  MRN: [id number]  Primary Care Physician:  Gloria Cuellar APRN  Referring Physician: AUGUSTINE Arshad  Date of admission: 2024    Inpatient Gastroenterology Consult  Consult performed by: Mahad Beyer MD  Consult ordered by: Maria Verdin MD        Subjective   Subjective     Reason for Consult/ Chief Complaint: hematemesis    History of Present Illness  Bernarda Randall is a 24 y.o. female presented with hematemesis.  She is an alcoholic and has been drinking lately.  She had vomiting and in fact did eventually vomit blood. Her parents are with her this evening.  She denies black or bloody stools.  She has had in the past chronic diarrhea,  A fecal calprotectin was elevated in may and she was supposed to get upper and lower tract scopes but did not do so. She states the diarrhea is improved.  She  has poor eye contact during history     Review of Systems   Gastrointestinal:  Positive for nausea and vomiting.   Neurological:  Positive for weakness.   Psychiatric/Behavioral:  Positive for dysphoric mood.         Personal History     Past Medical History:   Diagnosis Date    ADHD (attention deficit hyperactivity disorder)     Anxiety     Depression        Past Surgical History:   Procedure Laterality Date    EAR TUBES         Family History: family history includes Breast cancer in her maternal great-grandmother; Cancer in her maternal aunt; Colon polyps in her father and mother; Diabetes in her father; Endometriosis in her mother; Heart attack in her maternal aunt; Heart disease in her maternal grandfather; Hyperlipidemia in her mother; Hypertension in her mother; Liver disease in her maternal grandmother. Otherwise pertinent FHx was reviewed and not pertinent to current issue.    Social History:  reports that she has never smoked. She has never used smokeless tobacco. She reports current alcohol use. She reports  that she does not use drugs.    Home Medications:   amphetamine-dextroamphetamine XR, etonogestrel-ethinyl estradiol, omeprazole, and propranolol    Allergies:  Allergies   Allergen Reactions    Nuts Nausea Only    Peanut-Containing Drug Products Hives       Objective    Objective     Vitals:  Temp:  [98.8 °F (37.1 °C)-100.4 °F (38 °C)] 99.7 °F (37.6 °C)  Heart Rate:  [] 132  Resp:  [16-18] 16  BP: (128-143)/() 141/87    Physical Exam  Constitutional:       Appearance: She is ill-appearing.   HENT:      Right Ear: External ear normal.      Left Ear: External ear normal.      Mouth/Throat:      Pharynx: Oropharynx is clear.   Eyes:      Conjunctiva/sclera: Conjunctivae normal.   Cardiovascular:      Rate and Rhythm: Normal rate.      Pulses: Normal pulses.   Pulmonary:      Effort: Pulmonary effort is normal.   Abdominal:      General: Abdomen is flat.      Palpations: There is hepatomegaly.      Tenderness: There is abdominal tenderness in the right upper quadrant.   Skin:     General: Skin is warm and dry.   Neurological:      Mental Status: She is alert.         Result Review    Result Review:  I have personally reviewed the results from the time of this admission to 7/28/2024 18:20 EDT and agree with these findings:  []  Laboratory list / accordion  []  Microbiology  []  Radiology  []  EKG/Telemetry   []  Cardiology/Vascular   []  Pathology  []  Old records  []  Other:  Most notable findings include:       Assessment & Plan   Assessment / Plan     Brief Patient Summary:  Bernarda Randall is a 24 y.o. female who   Hematemesis suspect abdulaziz monreal tear, or alcholic gastritis  Alcoholic hepatitis  Epigastric pain     Active Hospital Problems:  Active Hospital Problems    Diagnosis     **Upper GI bleed     Alcohol abuse     Alcoholic gastritis     Fatty liver     Abnormal LFTs     Epigastric pain     Nausea     Attention deficit hyperactivity disorder (ADHD), combined type     Vapes nicotine  containing substance      Plan: agree with pantoprazole infusion  Plan for upper endoscopy with Dr Murphy tomorrow  Discussed with patient and family at length about resources for parents and patient for support in her reaching sobriety.  Suggested for patient AA and shannan lennon for parents  [facility] takes over care tomorrow       Mahad Beyer MD

## 2024-07-28 NOTE — H&P
Internal medicine history and physical  INTERNAL MEDICINE   [facility]       Patient Identification:  Name: Bernarda Randall  Age: 24 y.o.  Sex: female  :  2000  MRN: [id number]                   Primary Care Physician: Gloria Cuellar APRN                               Date of admission:2024    Chief Complaint: Nausea vomiting and abdominal pain since early this morning.    History of Present Illness:   Patient is a 24-year-old female with history of ADHD, anxiety, depression and fatty liver disease and admits to alcohol and did not quantify clearly how much she drinks but she states she drinks a lot and has been dealing with off-and-on abdominal discomfort with vomiting.  Despite this she has been drinking on a regular basis and claims that her last drink was last night.  She went to bed feeling sick at her stomach woke up this morning with pain and discomfort in her upper abdomen associated with vomiting and was concerned that she may have vomited dark material and possibly blood.  Patient denies any other symptoms such as fever or chills or dizzy spells or passing any blood in the stool or change in the color of her stool.  With these complaints she went to emergency room at [facility] where initial evaluation revealed her to be tachycardiac, mildly elevated liver function test lipase of 141 and lactic acid level of 4.5.  Patient apparently has been followed with gastroenterologist and has been given medication for acid reflux but she is not taking it.  Blood alcohol level was reported to be 68.  CT scan of the abdomen and pelvis did not reveal any acute intra-abdominal process except for known fatty infiltration of the liver.  Patient is being admitted for further care.      Past Medical History:  Past Medical History:   Diagnosis Date    ADHD (attention deficit hyperactivity disorder)     Anxiety     Depression      Past Surgical History:  Past Surgical History:  "  Procedure Laterality Date    EAR TUBES        Home Meds:  (Not in a hospital admission)    Current Meds:     Current Facility-Administered Medications:     LORazepam (ATIVAN) injection 1 mg, 1 mg, Intravenous, Once, Theresa Romo PA-C    Current Outpatient Medications:     amphetamine-dextroamphetamine XR (Adderall XR) 20 MG 24 hr capsule, Every 12 (Twelve) Hours, Disp: , Rfl:     Haloette 0.12-0.015 MG/24HR vaginal ring, , Disp: , Rfl:     omeprazole (priLOSEC) 40 MG capsule, TAKE 1 CAPSULE BY MOUTH DAILY, Disp: 30 capsule, Rfl: 1    propranolol (INDERAL) 10 MG tablet, Take 1 tablet by mouth 2 (Two) Times a Day As Needed (anxiety)., Disp: 60 tablet, Rfl: 5  Allergies:  Allergies   Allergen Reactions    Nuts Nausea Only     Social History:   Social History     Tobacco Use    Smoking status: Never    Smokeless tobacco: Never   Substance Use Topics    Alcohol use: Yes     Comment: on weekends      Family History:  Family History   Problem Relation Age of Onset    Colon polyps Mother     Hypertension Mother     Hyperlipidemia Mother     Endometriosis Mother     Colon polyps Father     Diabetes Father     Cancer Maternal Aunt     Heart attack Maternal Aunt     Liver disease Maternal Grandmother     Heart disease Maternal Grandfather     Breast cancer Maternal Great-Grandmother     Colon cancer Neg Hx     Crohn's disease Neg Hx     Irritable bowel syndrome Neg Hx     Ulcerative colitis Neg Hx           Review of Systems  See history of present illness and past medical history.   As described in history of presenting illness    Vitals:   /80 (BP Location: Left arm, Patient Position: Lying)   Pulse 111   Temp 100.4 °F (38 °C) (Oral)   Resp 18   Ht 167.6 cm (66\")   Wt 72.6 kg (160 lb)   LMP 05/30/2024 (Approximate)   SpO2 96%   BMI 25.82 kg/m²   I/O:   Intake/Output Summary (Last 24 hours) at 7/28/2024 1617  Last data filed at 7/28/2024 1610  Gross per 24 hour   Intake 2000 ml   Output --   Net 2000 ml "     Exam:  Patient is examined using the personal protective equipment as per guidelines from infection control for this particular patient as enacted.  Hand washing was performed before and after patient interaction.  General Appearance:  Alert cooperative nontoxic-appearing young female who appears well-nourished   Head:    Normocephalic, without obvious abnormality, atraumatic   Eyes:    PERRL, conjunctiva/corneas clear, EOM's intact, both eyes   Ears:    Normal external ear canals, both ears   Nose:   Nares normal, septum midline, mucosa normal, no drainage    or sinus tenderness   Throat:   Lips, tongue, gums normal; oral mucosa pink and moist   Neck: Supple and no adenopathy   Back:     Symmetric, no curvature, ROM normal, no CVA tenderness   Lungs:     Clear to auscultation bilaterally, respirations unlabored   Chest Wall:    No tenderness or deformity    Heart:  S1-S2 tachycardic   Abdomen:   Soft epigastric tenderness no guarding rigidity or rebound noted   Extremities:   Extremities normal, atraumatic, no cyanosis or edema   Pulses:   Pulses palpable in all extremities; symmetric all extremities   Skin:   Skin color normal, Skin is warm and dry,  no rashes or palpable lesions   Neurologic: Alert and oriented x 3 grossly nonfocal examination       Data Review:      I reviewed the patient's new clinical results.  Results from last 7 days   Lab Units 07/28/24  1107   WBC 10*3/mm3 6.11   HEMOGLOBIN g/dL 15.1   PLATELETS 10*3/mm3 276     Results from last 7 days   Lab Units 07/28/24  1153   SODIUM mmol/L 133*   POTASSIUM mmol/L 4.3   CHLORIDE mmol/L 96*   CO2 mmol/L 25.5   BUN mg/dL 2*   CREATININE mg/dL 0.63   CALCIUM mg/dL 8.6   GLUCOSE mg/dL 99     CT Abdomen Pelvis With Contrast    Result Date: 7/28/2024  1. Severe fatty infiltration of the liver. 2. No other significant findings are noted.   Radiation dose reduction techniques were utilized, including automated exposure control and exposure modulation  based on body size.   This report was finalized on 7/28/2024 12:49 PM by Dr. Xander Up M.D on Workstation: [id number]     Microbiology Results (last 10 days)       ** No results found for the last 240 hours. **          Telemetry Scan   Final Result      Telemetry Scan   Final Result      Telemetry Scan   Final Result          Assessment:  Active Hospital Problems    Diagnosis  POA    **Upper GI bleed [K92.2]  Yes    Alcohol abuse [F10.10]  Unknown    Alcoholic gastritis [K29.20]  Unknown    Fatty liver [K76.0]  Unknown    Abnormal LFTs [R79.89]  Unknown    Epigastric pain [R10.13]  Yes    Nausea [R11.0]  Yes    Attention deficit hyperactivity disorder (ADHD), combined type [F90.2]  Yes    Vapes nicotine containing substance [Z72.0]  Yes       Medical decision making/CARE plan: See admitting orders  Nausea vomiting abdominal pain with coffee-ground emesis in the setting of significant alcohol use and mildly elevated LFTs and lipase with negative CT scan of the abdomen and pelvis-this likely represent acute alcoholic gastritis versus pancreatitis versus combination of both-plan is to admit the patient continue with IV Protonix, keep her n.p.o. except for ice chips and medications and check serial H&H.  Gastroenterology consultation and further management as her condition evolves.  Provide symptomatic relief of pain and nausea.  Abnormal LFTs-multifactorial including underlying probable fatty liver disease and ongoing use of alcohol-monitor liver function and avoid hepatotoxic agents.  Alcohol abuse with blood alcohol level of 68 and last drink reported last night-patient is at risk of alcohol withdrawal-placed on CIWA protocol, replace thiamine and folic acid provided with cautious hydration and monitor electrolytes and replace per protocol.  Nicotine vaping-provided with nicotine patch and smoking cessation counseling.  Attention deficit hyperactivity disorder-continue home regimen and monitor.    Jawed  MD Lambert   7/28/2024  16:17 EDT    Parts of this note may be an electronic transcription/translation of spoken language to printed text using the Dragon dictation system.

## 2024-07-28 NOTE — FSED PROVIDER NOTE
Subjective   History of Present Illness  Patient is a 24-year-old female with a history of alcohol abuse and fatty liver disease.  She has chronic flareups of epigastric pain with vomiting.  Patient says she is supposed to take a stomach pill but she cannot recall what is called and does not take it.  She has seen GI specialist and they want to set her up for colonoscopy and EGD but this has not been done yet.    She says she has seen blood in the vomit.  No chance of pregnancy.      Review of Systems   Constitutional: Negative.    HENT: Negative.     Respiratory: Negative.     Cardiovascular: Negative.    Gastrointestinal:  Positive for abdominal pain, nausea and vomiting (blood).   Genitourinary: Negative.    Musculoskeletal: Negative.    Neurological: Negative.    Psychiatric/Behavioral: Negative.     All other systems reviewed and are negative.      Past Medical History:   Diagnosis Date    ADHD (attention deficit hyperactivity disorder)     Anxiety     Depression        Allergies   Allergen Reactions    Nuts Nausea Only       Past Surgical History:   Procedure Laterality Date    EAR TUBES         Family History   Problem Relation Age of Onset    Colon polyps Mother     Hypertension Mother     Hyperlipidemia Mother     Endometriosis Mother     Colon polyps Father     Diabetes Father     Cancer Maternal Aunt     Heart attack Maternal Aunt     Liver disease Maternal Grandmother     Heart disease Maternal Grandfather     Breast cancer Maternal Great-Grandmother     Colon cancer Neg Hx     Crohn's disease Neg Hx     Irritable bowel syndrome Neg Hx     Ulcerative colitis Neg Hx        Social History     Socioeconomic History    Marital status: Single   Tobacco Use    Smoking status: Never    Smokeless tobacco: Never   Vaping Use    Vaping status: Every Day    Substances: Nicotine    Devices: Disposable   Substance and Sexual Activity    Alcohol use: Yes     Comment: on weekends    Drug use: Never    Sexual activity:  Not Currently           Objective   Physical Exam  Vitals reviewed.   Constitutional:       Appearance: She is well-developed.   Cardiovascular:      Rate and Rhythm: Tachycardia present.   Pulmonary:      Effort: Pulmonary effort is normal.      Breath sounds: Normal breath sounds.   Abdominal:      Palpations: Abdomen is soft.      Tenderness: There is abdominal tenderness in the right upper quadrant, epigastric area and left upper quadrant. There is no right CVA tenderness, left CVA tenderness or guarding. Negative signs include Serrano's sign and McBurney's sign.   Skin:     General: Skin is warm and dry.   Neurological:      General: No focal deficit present.   Psychiatric:         Mood and Affect: Mood normal.         Behavior: Behavior normal.         Procedures           ED Course                                           Medical Decision Making  Patient has been actively vomiting/dry heaving.  There is some stomach contents that has a pinkish-brown discoloration consistent with coffee-ground emesis.  In looking at her chart she does have a history of fatty liver.  she says her GI problems stem from alcohol.  She is tachycardic with heart rate around 115.    She is tachycardic and given fluids.  I also gave Protonix, Zofran, morphine.  She continued to be nauseated and we added droperidol.  She has elevated lactic acid of 4.5 which came down to 2.2 after fluids.  Normal white count.  There is some elevation in bilirubin of 1.8,  and ALT of 81.  Alk phos normal.  CT is concerning for severe fatty liver.    I spoke with Dr. Verdin and he accepts patient.  I did speak with Dr. Beyer for GI and he will be consulted.  Patient is no longer vomiting at this time there are still Concerns for the bleeding and she will be admitted.  Mother will take her directly to [facility].    Amount and/or Complexity of Data Reviewed  Labs: ordered.  Radiology: ordered.    Risk  Prescription drug management.  Decision  regarding hospitalization.        Final diagnoses:   Upper GI bleed   Alcohol abuse       ED Disposition  ED Disposition       ED Disposition   Decision to Admit    Condition   --    Comment   Level of Care: Telemetry [5]   Diagnosis: Upper GI bleed [347169]   Admitting Physician: COREY RODRIGUEZ [[id number]]   Attending Physician: COREY RODRIGUEZ [[id number]]                 No follow-up provider specified.       Medication List        Stop      Benadryl Allergy 25 MG tablet  Generic drug: diphenhydrAMINE     cetirizine 10 MG tablet  Commonly known as: zyrTEC     fluticasone 50 MCG/ACT nasal spray  Commonly known as: FLONASE     Lurasidone HCl 20 MG tablet tablet  Commonly known as: LATUDA     phenazopyridine 200 MG tablet  Commonly known as: PYRIDIUM

## 2024-07-28 NOTE — ED NOTES
Nursing report ED to floor  Bernarda Randall  24 y.o.  female    HPI :  HPI (Adult)  Stated Reason for Visit: abd pain and vomitting started this morning    Chief Complaint  Chief Complaint   Patient presents with    Abdominal Pain    Vomiting       Admitting doctor:   Maria Verdin MD    Admitting diagnosis:   There were no encounter diagnoses.    Code status:   Current Code Status       Date Active Code Status Order ID Comments User Context       Not on file            Allergies:   Nuts    Isolation:   No active isolations    Intake and Output    Intake/Output Summary (Last 24 hours) at 7/28/2024 1548  Last data filed at 7/28/2024 1321  Gross per 24 hour   Intake 1000 ml   Output --   Net 1000 ml       Weight:       07/28/24  1040   Weight: 72.6 kg (160 lb)       Most recent vitals:   Vitals:    07/28/24 1330 07/28/24 1400 07/28/24 1421 07/28/24 1530   BP: 140/84 128/87  129/83   Pulse: 93 95 101 115   Resp:    18   Temp:       SpO2: 100% 100% 100% 97%   Weight:       Height:           Active LDAs/IV Access:   Lines, Drains & Airways       Active LDAs       Name Placement date Placement time Site Days    Peripheral IV 07/28/24 1108 Right Antecubital 07/28/24  1108  Antecubital  less than 1                    Labs (abnormal labs have a star):   Labs Reviewed   COMPREHENSIVE METABOLIC PANEL - Abnormal; Notable for the following components:       Result Value    BUN 2 (*)     Sodium 133 (*)     Chloride 96 (*)     ALT (SGPT) 81 (*)     AST (SGOT) 193 (*)     Total Bilirubin 1.8 (*)     BUN/Creatinine Ratio 3.2 (*)     All other components within normal limits    Narrative:     GFR Normal >60  Chronic Kidney Disease <60  Kidney Failure <15     LIPASE - Abnormal; Notable for the following components:    Lipase 141 (*)     All other components within normal limits   LACTIC ACID, PLASMA - Abnormal; Notable for the following components:    Lactate 4.5 (*)     All other components within normal limits   CBC WITH AUTO  DIFFERENTIAL - Abnormal; Notable for the following components:    .7 (*)     MCH 35.4 (*)     RDW 16.0 (*)     RDW-SD 62.2 (*)     Neutrophil % 77.8 (*)     Lymphocyte % 12.1 (*)     Eosinophil % 0.2 (*)     All other components within normal limits   ETHANOL - Abnormal; Notable for the following components:    Ethanol 68 (*)     All other components within normal limits   LACTIC ACID, REFLEX - Abnormal; Notable for the following components:    Lactate 2.2 (*)     All other components within normal limits   URINALYSIS WITHOUT MICROSCOPIC (NO CULTURE) - Abnormal; Notable for the following components:    Appearance, UA Slightly Cloudy (*)     All other components within normal limits   HCG, SERUM, QUALITATIVE - Normal   LACTIC ACID, REFLEX   CBC AND DIFFERENTIAL    Narrative:     The following orders were created for panel order CBC & Differential.  Procedure                               Abnormality         Status                     ---------                               -----------         ------                     CBC Auto Differential[[id number]]        Abnormal            Final result                 Please view results for these tests on the individual orders.       EKG:   No orders to display       Meds given in ED:   Medications   LORazepam (ATIVAN) injection 1 mg (0 mg Intravenous Hold 7/28/24 1148)   Morphine sulfate (PF) injection 4 mg (4 mg Intravenous Given 7/28/24 1120)   ondansetron (ZOFRAN) injection 4 mg (4 mg Intravenous Given 7/28/24 1117)   sodium chloride 0.9 % bolus 1,000 mL (0 mL Intravenous Stopped 7/28/24 1321)   pantoprazole (PROTONIX) injection 80 mg (80 mg Intravenous Given 7/28/24 1113)   iopamidol (ISOVUE-370) 76 % injection 100 mL (85 mL Intravenous Given 7/28/24 1156)   droperidol (INAPSINE) injection 2.5 mg (2.5 mg Intravenous Given 7/28/24 1324)   sodium chloride 0.9 % bolus 1,000 mL (1,000 mL Intravenous New Bag 7/28/24 1324)       Imaging results:  CT Abdomen Pelvis With  Contrast    Result Date: 7/28/2024  1. Severe fatty infiltration of the liver. 2. No other significant findings are noted.   Radiation dose reduction techniques were utilized, including automated exposure control and exposure modulation based on body size.   This report was finalized on 7/28/2024 12:49 PM by Dr. Xander Up M.D on Workstation: [id number]       Ambulatory status:   Partial assist    Social issues:   Social History     Socioeconomic History    Marital status: Single   Tobacco Use    Smoking status: Never    Smokeless tobacco: Never   Vaping Use    Vaping status: Every Day    Substances: Nicotine    Devices: Disposable   Substance and Sexual Activity    Alcohol use: Yes     Comment: on weekends    Drug use: Never    Sexual activity: Not Currently       Peripheral Neurovascular  Peripheral Neurovascular (Adult)  Peripheral Neurovascular WDL: WDL    Neuro Cognitive  Neuro Cognitive (Adult)  Cognitive/Neuro/Behavioral WDL: WDL    Learning  Learning Assessment (Adult)  Learning Readiness and Ability: no barriers identified    Respiratory  Respiratory (Adult)  Airway WDL: WDL  Respiratory WDL  Respiratory WDL: WDL    Abdominal Pain       Pain Assessments  Pain (Adult)  (0-10) Pain Rating: Rest: 8    NIH Stroke Scale       Lauren Jenkins RN  07/28/24 15:48 EDT

## 2024-07-28 NOTE — Clinical Note
Level of Care: Telemetry [5]   Diagnosis: Upper GI bleed [954694]   Admitting Physician: COREY RODRIGUEZ [[id number]]   Attending Physician: COREY RODRIGUEZ [[id number]]   Isolate for COVID?: No [0]   Certification: I Certify That Inpatient Hospital Services Are Medically Necessary For Greater Than 2 Midnights  
1

## 2024-07-29 ENCOUNTER — APPOINTMENT (OUTPATIENT)
Dept: GENERAL RADIOLOGY | Facility: HOSPITAL | Age: 24
End: 2024-07-29
Payer: COMMERCIAL

## 2024-07-29 ENCOUNTER — ANESTHESIA (OUTPATIENT)
Dept: GASTROENTEROLOGY | Facility: HOSPITAL | Age: 24
End: 2024-07-29
Payer: COMMERCIAL

## 2024-07-29 ENCOUNTER — ANESTHESIA EVENT (OUTPATIENT)
Dept: GASTROENTEROLOGY | Facility: HOSPITAL | Age: 24
End: 2024-07-29
Payer: COMMERCIAL

## 2024-07-29 PROBLEM — U07.1 COVID-19 VIRUS INFECTION: Status: ACTIVE | Noted: 2024-07-29

## 2024-07-29 PROBLEM — K70.10 ALCOHOLIC HEPATITIS: Status: ACTIVE | Noted: 2024-07-28

## 2024-07-29 LAB
ALBUMIN SERPL-MCNC: 3.3 G/DL (ref 3.5–5.2)
ALBUMIN/GLOB SERPL: 1.3 G/DL
ALP SERPL-CCNC: 85 U/L (ref 39–117)
ALT SERPL W P-5'-P-CCNC: 64 U/L (ref 1–33)
ANION GAP SERPL CALCULATED.3IONS-SCNC: 11.4 MMOL/L (ref 5–15)
AST SERPL-CCNC: 134 U/L (ref 1–32)
B PARAPERT DNA SPEC QL NAA+PROBE: NOT DETECTED
B PERT DNA SPEC QL NAA+PROBE: NOT DETECTED
BASOPHILS # BLD AUTO: 0.02 10*3/MM3 (ref 0–0.2)
BASOPHILS NFR BLD AUTO: 0.6 % (ref 0–1.5)
BILIRUB SERPL-MCNC: 2.3 MG/DL (ref 0–1.2)
BUN SERPL-MCNC: 3 MG/DL (ref 6–20)
BUN/CREAT SERPL: 4.4 (ref 7–25)
C PNEUM DNA NPH QL NAA+NON-PROBE: NOT DETECTED
CALCIUM SPEC-SCNC: 8.2 MG/DL (ref 8.6–10.5)
CHLORIDE SERPL-SCNC: 101 MMOL/L (ref 98–107)
CO2 SERPL-SCNC: 22.6 MMOL/L (ref 22–29)
CREAT SERPL-MCNC: 0.68 MG/DL (ref 0.57–1)
D-LACTATE SERPL-SCNC: 0.6 MMOL/L (ref 0.5–2)
DEPRECATED RDW RBC AUTO: 66 FL (ref 37–54)
EGFRCR SERPLBLD CKD-EPI 2021: 125.7 ML/MIN/1.73
EOSINOPHIL # BLD AUTO: 0.01 10*3/MM3 (ref 0–0.4)
EOSINOPHIL NFR BLD AUTO: 0.3 % (ref 0.3–6.2)
ERYTHROCYTE [DISTWIDTH] IN BLOOD BY AUTOMATED COUNT: 17.6 % (ref 12.3–15.4)
FLUAV SUBTYP SPEC NAA+PROBE: NOT DETECTED
FLUBV RNA ISLT QL NAA+PROBE: NOT DETECTED
GLOBULIN UR ELPH-MCNC: 2.5 GM/DL
GLUCOSE SERPL-MCNC: 79 MG/DL (ref 65–99)
HADV DNA SPEC NAA+PROBE: NOT DETECTED
HCOV 229E RNA SPEC QL NAA+PROBE: NOT DETECTED
HCOV HKU1 RNA SPEC QL NAA+PROBE: NOT DETECTED
HCOV NL63 RNA SPEC QL NAA+PROBE: NOT DETECTED
HCOV OC43 RNA SPEC QL NAA+PROBE: NOT DETECTED
HCT VFR BLD AUTO: 34.4 % (ref 34–46.6)
HCT VFR BLD AUTO: 38.6 % (ref 34–46.6)
HGB BLD-MCNC: 11.8 G/DL (ref 12–15.9)
HGB BLD-MCNC: 13.3 G/DL (ref 12–15.9)
HMPV RNA NPH QL NAA+NON-PROBE: NOT DETECTED
HPIV1 RNA ISLT QL NAA+PROBE: NOT DETECTED
HPIV2 RNA SPEC QL NAA+PROBE: NOT DETECTED
HPIV3 RNA NPH QL NAA+PROBE: NOT DETECTED
HPIV4 P GENE NPH QL NAA+PROBE: NOT DETECTED
IMM GRANULOCYTES # BLD AUTO: 0.01 10*3/MM3 (ref 0–0.05)
IMM GRANULOCYTES NFR BLD AUTO: 0.3 % (ref 0–0.5)
LYMPHOCYTES # BLD AUTO: 0.96 10*3/MM3 (ref 0.7–3.1)
LYMPHOCYTES NFR BLD AUTO: 28.6 % (ref 19.6–45.3)
M PNEUMO IGG SER IA-ACNC: NOT DETECTED
MAGNESIUM SERPL-MCNC: 1.5 MG/DL (ref 1.6–2.6)
MCH RBC QN AUTO: 35.3 PG (ref 26.6–33)
MCHC RBC AUTO-ENTMCNC: 34.3 G/DL (ref 31.5–35.7)
MCV RBC AUTO: 103 FL (ref 79–97)
MONOCYTES # BLD AUTO: 0.55 10*3/MM3 (ref 0.1–0.9)
MONOCYTES NFR BLD AUTO: 16.4 % (ref 5–12)
NEUTROPHILS NFR BLD AUTO: 1.81 10*3/MM3 (ref 1.7–7)
NEUTROPHILS NFR BLD AUTO: 53.8 % (ref 42.7–76)
NRBC BLD AUTO-RTO: 0 /100 WBC (ref 0–0.2)
PHOSPHATE SERPL-MCNC: 4.2 MG/DL (ref 2.5–4.5)
PLATELET # BLD AUTO: 175 10*3/MM3 (ref 140–450)
PMV BLD AUTO: 9.6 FL (ref 6–12)
POTASSIUM SERPL-SCNC: 3.6 MMOL/L (ref 3.5–5.2)
POTASSIUM SERPL-SCNC: 4.5 MMOL/L (ref 3.5–5.2)
PROCALCITONIN SERPL-MCNC: 0.13 NG/ML (ref 0–0.25)
PROT SERPL-MCNC: 5.8 G/DL (ref 6–8.5)
RBC # BLD AUTO: 3.34 10*6/MM3 (ref 3.77–5.28)
RHINOVIRUS RNA SPEC NAA+PROBE: NOT DETECTED
RSV RNA NPH QL NAA+NON-PROBE: NOT DETECTED
SARS-COV-2 RNA NPH QL NAA+NON-PROBE: DETECTED
SODIUM SERPL-SCNC: 135 MMOL/L (ref 136–145)
WBC NRBC COR # BLD AUTO: 3.36 10*3/MM3 (ref 3.4–10.8)

## 2024-07-29 PROCEDURE — 83605 ASSAY OF LACTIC ACID: CPT | Performed by: INTERNAL MEDICINE

## 2024-07-29 PROCEDURE — 90791 PSYCH DIAGNOSTIC EVALUATION: CPT

## 2024-07-29 PROCEDURE — 96361 HYDRATE IV INFUSION ADD-ON: CPT

## 2024-07-29 PROCEDURE — 84145 PROCALCITONIN (PCT): CPT | Performed by: HOSPITALIST

## 2024-07-29 PROCEDURE — 25810000003 SODIUM CHLORIDE 0.9 % SOLUTION: Performed by: INTERNAL MEDICINE

## 2024-07-29 PROCEDURE — 25010000002 PROPOFOL 1000 MG/100ML EMULSION: Performed by: NURSE ANESTHETIST, CERTIFIED REGISTERED

## 2024-07-29 PROCEDURE — 85018 HEMOGLOBIN: CPT | Performed by: HOSPITALIST

## 2024-07-29 PROCEDURE — 71046 X-RAY EXAM CHEST 2 VIEWS: CPT

## 2024-07-29 PROCEDURE — 80053 COMPREHEN METABOLIC PANEL: CPT | Performed by: INTERNAL MEDICINE

## 2024-07-29 PROCEDURE — 83735 ASSAY OF MAGNESIUM: CPT | Performed by: INTERNAL MEDICINE

## 2024-07-29 PROCEDURE — 93005 ELECTROCARDIOGRAM TRACING: CPT | Performed by: HOSPITALIST

## 2024-07-29 PROCEDURE — 84132 ASSAY OF SERUM POTASSIUM: CPT | Performed by: HOSPITALIST

## 2024-07-29 PROCEDURE — 93010 ELECTROCARDIOGRAM REPORT: CPT | Performed by: INTERNAL MEDICINE

## 2024-07-29 PROCEDURE — G0378 HOSPITAL OBSERVATION PER HR: HCPCS

## 2024-07-29 PROCEDURE — 96376 TX/PRO/DX INJ SAME DRUG ADON: CPT

## 2024-07-29 PROCEDURE — 25010000002 MAGNESIUM SULFATE 2 GM/50ML SOLUTION: Performed by: HOSPITALIST

## 2024-07-29 PROCEDURE — 25010000002 PROPOFOL 200 MG/20ML EMULSION: Performed by: NURSE ANESTHETIST, CERTIFIED REGISTERED

## 2024-07-29 PROCEDURE — 88305 TISSUE EXAM BY PATHOLOGIST: CPT | Performed by: INTERNAL MEDICINE

## 2024-07-29 PROCEDURE — 85014 HEMATOCRIT: CPT | Performed by: HOSPITALIST

## 2024-07-29 PROCEDURE — 84100 ASSAY OF PHOSPHORUS: CPT | Performed by: INTERNAL MEDICINE

## 2024-07-29 PROCEDURE — 0202U NFCT DS 22 TRGT SARS-COV-2: CPT | Performed by: HOSPITALIST

## 2024-07-29 PROCEDURE — 85025 COMPLETE CBC W/AUTO DIFF WBC: CPT | Performed by: INTERNAL MEDICINE

## 2024-07-29 PROCEDURE — 25010000002 GLYCOPYRROLATE 0.2 MG/ML SOLUTION: Performed by: NURSE ANESTHETIST, CERTIFIED REGISTERED

## 2024-07-29 PROCEDURE — 25010000002 THIAMINE HCL 200 MG/2ML SOLUTION: Performed by: INTERNAL MEDICINE

## 2024-07-29 RX ORDER — PROPOFOL 10 MG/ML
INJECTION, EMULSION INTRAVENOUS CONTINUOUS PRN
Status: DISCONTINUED | OUTPATIENT
Start: 2024-07-29 | End: 2024-07-29 | Stop reason: SURG

## 2024-07-29 RX ORDER — POTASSIUM CHLORIDE 750 MG/1
40 TABLET, FILM COATED, EXTENDED RELEASE ORAL EVERY 4 HOURS
Status: COMPLETED | OUTPATIENT
Start: 2024-07-29 | End: 2024-07-29

## 2024-07-29 RX ORDER — LIDOCAINE HYDROCHLORIDE 20 MG/ML
INJECTION, SOLUTION INFILTRATION; PERINEURAL AS NEEDED
Status: DISCONTINUED | OUTPATIENT
Start: 2024-07-29 | End: 2024-07-29 | Stop reason: SURG

## 2024-07-29 RX ORDER — GLYCOPYRROLATE 0.2 MG/ML
INJECTION INTRAMUSCULAR; INTRAVENOUS AS NEEDED
Status: DISCONTINUED | OUTPATIENT
Start: 2024-07-29 | End: 2024-07-29 | Stop reason: SURG

## 2024-07-29 RX ORDER — MAGNESIUM SULFATE HEPTAHYDRATE 40 MG/ML
2 INJECTION, SOLUTION INTRAVENOUS
Status: COMPLETED | OUTPATIENT
Start: 2024-07-29 | End: 2024-07-29

## 2024-07-29 RX ORDER — CLONIDINE HYDROCHLORIDE 0.1 MG/1
0.1 TABLET ORAL EVERY 4 HOURS PRN
Status: DISCONTINUED | OUTPATIENT
Start: 2024-07-29 | End: 2024-07-30 | Stop reason: HOSPADM

## 2024-07-29 RX ORDER — BENZONATATE 100 MG/1
200 CAPSULE ORAL 3 TIMES DAILY PRN
Status: DISCONTINUED | OUTPATIENT
Start: 2024-07-29 | End: 2024-07-30 | Stop reason: HOSPADM

## 2024-07-29 RX ORDER — SODIUM CHLORIDE 9 MG/ML
30 INJECTION, SOLUTION INTRAVENOUS CONTINUOUS PRN
Status: DISCONTINUED | OUTPATIENT
Start: 2024-07-29 | End: 2024-07-30 | Stop reason: HOSPADM

## 2024-07-29 RX ORDER — PROPOFOL 10 MG/ML
INJECTION, EMULSION INTRAVENOUS AS NEEDED
Status: DISCONTINUED | OUTPATIENT
Start: 2024-07-29 | End: 2024-07-29 | Stop reason: SURG

## 2024-07-29 RX ORDER — PROPRANOLOL HYDROCHLORIDE 10 MG/1
10 TABLET ORAL EVERY 12 HOURS SCHEDULED
Status: DISCONTINUED | OUTPATIENT
Start: 2024-07-29 | End: 2024-07-30 | Stop reason: HOSPADM

## 2024-07-29 RX ADMIN — SODIUM CHLORIDE 125 ML/HR: 9 INJECTION, SOLUTION INTRAVENOUS at 11:41

## 2024-07-29 RX ADMIN — LORAZEPAM 2 MG: 1 TABLET ORAL at 00:17

## 2024-07-29 RX ADMIN — LIDOCAINE HYDROCHLORIDE 70 MG: 20 INJECTION, SOLUTION INFILTRATION; PERINEURAL at 09:39

## 2024-07-29 RX ADMIN — FOLIC ACID 1 MG: 1 TABLET ORAL at 11:41

## 2024-07-29 RX ADMIN — PANTOPRAZOLE SODIUM 40 MG: 40 INJECTION, POWDER, FOR SOLUTION INTRAVENOUS at 20:38

## 2024-07-29 RX ADMIN — LORAZEPAM 1 MG: 1 TABLET ORAL at 23:00

## 2024-07-29 RX ADMIN — PANTOPRAZOLE SODIUM 40 MG: 40 INJECTION, POWDER, FOR SOLUTION INTRAVENOUS at 11:41

## 2024-07-29 RX ADMIN — THIAMINE HYDROCHLORIDE 200 MG: 100 INJECTION, SOLUTION INTRAMUSCULAR; INTRAVENOUS at 14:22

## 2024-07-29 RX ADMIN — POTASSIUM CHLORIDE 40 MEQ: 750 TABLET, EXTENDED RELEASE ORAL at 16:05

## 2024-07-29 RX ADMIN — SODIUM CHLORIDE 30 ML/HR: 9 INJECTION, SOLUTION INTRAVENOUS at 09:28

## 2024-07-29 RX ADMIN — POTASSIUM CHLORIDE 40 MEQ: 750 TABLET, EXTENDED RELEASE ORAL at 11:41

## 2024-07-29 RX ADMIN — LORAZEPAM 2 MG: 1 TABLET ORAL at 11:41

## 2024-07-29 RX ADMIN — GLYCOPYRROLATE 0.2 MG: 0.2 INJECTION INTRAMUSCULAR; INTRAVENOUS at 09:40

## 2024-07-29 RX ADMIN — THIAMINE HYDROCHLORIDE 200 MG: 100 INJECTION, SOLUTION INTRAMUSCULAR; INTRAVENOUS at 06:32

## 2024-07-29 RX ADMIN — MAGNESIUM SULFATE HEPTAHYDRATE 2 G: 40 INJECTION, SOLUTION INTRAVENOUS at 14:18

## 2024-07-29 RX ADMIN — THIAMINE HYDROCHLORIDE 200 MG: 100 INJECTION, SOLUTION INTRAMUSCULAR; INTRAVENOUS at 22:58

## 2024-07-29 RX ADMIN — SODIUM CHLORIDE 125 ML/HR: 9 INJECTION, SOLUTION INTRAVENOUS at 00:20

## 2024-07-29 RX ADMIN — Medication 10 ML: at 20:38

## 2024-07-29 RX ADMIN — PROPRANOLOL HYDROCHLORIDE 10 MG: 10 TABLET ORAL at 20:39

## 2024-07-29 RX ADMIN — DEXTROAMPHETAMINE SACCHARATE, AMPHETAMINE ASPARTATE MONOHYDRATE, DEXTROAMPHETAMINE SULFATE, AND AMPHETAMINE SULFATE 20 MG: 2.5; 2.5; 2.5; 2.5 CAPSULE ORAL at 06:31

## 2024-07-29 RX ADMIN — PROPOFOL INJECTABLE EMULSION 100 MG: 10 INJECTION, EMULSION INTRAVENOUS at 09:40

## 2024-07-29 RX ADMIN — THIAMINE HYDROCHLORIDE 200 MG: 100 INJECTION, SOLUTION INTRAMUSCULAR; INTRAVENOUS at 00:17

## 2024-07-29 RX ADMIN — LORAZEPAM 1 MG: 1 TABLET ORAL at 18:40

## 2024-07-29 RX ADMIN — SODIUM CHLORIDE 125 ML/HR: 9 INJECTION, SOLUTION INTRAVENOUS at 06:36

## 2024-07-29 RX ADMIN — MAGNESIUM SULFATE HEPTAHYDRATE 2 G: 40 INJECTION, SOLUTION INTRAVENOUS at 11:41

## 2024-07-29 RX ADMIN — PROPOFOL 200 MCG/KG/MIN: 10 INJECTION, EMULSION INTRAVENOUS at 09:39

## 2024-07-29 RX ADMIN — PROPRANOLOL HYDROCHLORIDE 10 MG: 10 TABLET ORAL at 14:18

## 2024-07-29 RX ADMIN — MAGNESIUM SULFATE HEPTAHYDRATE 2 G: 40 INJECTION, SOLUTION INTRAVENOUS at 16:10

## 2024-07-29 RX ADMIN — Medication 10 ML: at 11:42

## 2024-07-29 RX ADMIN — DEXTROAMPHETAMINE SACCHARATE, AMPHETAMINE ASPARTATE MONOHYDRATE, DEXTROAMPHETAMINE SULFATE, AND AMPHETAMINE SULFATE 20 MG: 2.5; 2.5; 2.5; 2.5 CAPSULE ORAL at 18:40

## 2024-07-29 RX ADMIN — LORAZEPAM 2 MG: 1 TABLET ORAL at 06:31

## 2024-07-29 NOTE — ANESTHESIA POSTPROCEDURE EVALUATION
Patient: Bernarda Randall    Procedure Summary       Date: 07/29/24 Room / Location: [facility] ENDOSCOPY 10 / [facility] ENDOSCOPY    Anesthesia Start: 0937 Anesthesia Stop: 0952    Procedure: ESOPHAGOGASTRODUODENOSCOPY with cold biopsies (Esophagus) Diagnosis:       Upper GI bleed      (Upper GI bleed [K92.2])    Surgeons: Fran Murphy MD Provider: Hal Liang MD    Anesthesia Type: MAC ASA Status: 2            Anesthesia Type: MAC    Vitals  Vitals Value Taken Time   /106 07/29/24 1005   Temp     Pulse 84 07/29/24 1012   Resp 20 07/29/24 1005   SpO2 100 % 07/29/24 1012   Vitals shown include unfiled device data.        Post Anesthesia Care and Evaluation    Patient location during evaluation: PACU  Patient participation: complete - patient participated  Level of consciousness: awake and alert  Pain management: adequate    Airway patency: patent  Anesthetic complications: No anesthetic complications    Cardiovascular status: acceptable  Respiratory status: acceptable  Hydration status: acceptable    Comments: ---------------------            07/29/24                1005      ---------------------   BP:     (!) 148/106   Pulse:      109       Resp:       20        Temp:                 SpO2:      100%      ---------------------

## 2024-07-29 NOTE — CASE MANAGEMENT/SOCIAL WORK
Continued Stay Note  [facility]     Patient Name: Bernarda Randall  MRN: [id number]  Today's Date: 7/29/2024    Admit Date: 7/28/2024    Plan: Plan home with parents.   KELLI White RN   Discharge Plan       Row Name 07/29/24 1243       Plan    Plan Plan home with parents.   KELLI White RN    Patient/Family in Agreement with Plan yes    Plan Comments FACE SHEET VERIFIED.  Spoke with pt and pt's mother  (Eva) at bedside. Pt's PCP is Gloria Grissom. Pt lives with her parents -mother ( Eva Randall [phone]) and father (Олег Randall) in a two story house.  Pt is independent with ADLs. Pt denies using any DMEs. Pt gets her prescriptions at [facility] [facility] [employer]  Pt denies any issues affording medications. Pt is not current with HH. Pt has not been in SNF. Pt denies any discharge needs. Pt's parents will assist pt at home if needed and will transport her home. Plan home with parents.  KELLI White RN                   Discharge Codes    No documentation.                 Expected Discharge Date and Time       Expected Discharge Date Expected Discharge Time    Jul 30, 2024               Vesna White RN

## 2024-07-29 NOTE — PLAN OF CARE
Goal Outcome Evaluation:  Plan of Care Reviewed With: patient        Progress: no change  Outcome Evaluation: Patient  resting in bed. Continues  to be monitored  for  CIWA.  Scale  at 6 &5  respectively.   Scheduled  Ativan   given.   IV  fluids  infusing  as  ordered.     Patient  NPO  for   EGD    this am.  Continue serial  H/H,       Up  to the bathroom   with one assist.  Gait  unsteady.  Room  air.  Sinus Tach/Sinus  rhythm on  the  monitor.   Nursing  will  continue to monitor.
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
## 2024-07-29 NOTE — PROGRESS NOTES
Name: Bernarda Randall ADMIT: 2024   : 2000  PCP: Gloria Cuellar APRN    MRN: [id number] LOS: 0 days   AGE/SEX: 24 y.o. female  ROOM: ENDO/ENDO     Subjective   Subjective   Has not vomited since the emergency department.  Still some abdominal discomfort.  Mild cough.  Fever overnight.  She does report having an aspiration event during one of her episodes of emesis at home.  Of breath.       Objective   Objective   Vital Signs  Temp:  [98.4 °F (36.9 °C)-101.2 °F (38.4 °C)] 99.4 °F (37.4 °C)  Heart Rate:  [] 102  Resp:  [16-18] 16  BP: (109-143)/() 136/100  Body mass index is 25.82 kg/m².    Physical Exam  Vitals and nursing note reviewed.   Constitutional:       General: She is not in acute distress.     Appearance: She is ill-appearing.   Cardiovascular:      Rate and Rhythm: Normal rate and regular rhythm.   Pulmonary:      Effort: Pulmonary effort is normal.      Breath sounds: Normal breath sounds. No wheezing or rhonchi.   Abdominal:      General: Bowel sounds are normal.      Palpations: Abdomen is soft.      Tenderness: There is no abdominal tenderness.   Musculoskeletal:         General: No swelling.   Skin:     General: Skin is warm and dry.   Neurological:      Mental Status: She is alert. Mental status is at baseline.      Comments: Slightly tremulous         Results Review:       I reviewed the patient's new clinical results.  Results from last 7 days   Lab Units 24  0609 24  1107   WBC 10*3/mm3 3.36*  --  6.11   HEMOGLOBIN g/dL 11.8* 13.1 15.1   PLATELETS 10*3/mm3 175  --  276     Results from last 7 days   Lab Units 24  0609 24  1153   SODIUM mmol/L 135* 133*   POTASSIUM mmol/L 3.6 4.3   CHLORIDE mmol/L 101 96*   CO2 mmol/L 22.6 25.5   BUN mg/dL 3* 2*   CREATININE mg/dL 0.68 0.63   GLUCOSE mg/dL 79 99   EGFR mL/min/1.73 125.7 128.0   ALBUMIN g/dL 3.3* 3.8   BILIRUBIN mg/dL 2.3* 1.8*   ALK PHOS U/L 85 97   AST (SGOT) U/L 134* 193*    ALT (SGPT) U/L 64* 81*     Results from last 7 days   Lab Units 07/29/24  0609 07/28/24  1153   CALCIUM mg/dL 8.2* 8.6   ALBUMIN g/dL 3.3* 3.8   MAGNESIUM mg/dL 1.5*  --    PHOSPHORUS mg/dL 4.2  --                I have personally reviewed all medications:  Scheduled Medications  amphetamine-dextroamphetamine XR, 20 mg, Oral, Q12H  folic acid, 1 mg, Oral, Daily  LORazepam, 2 mg, Oral, Q6H   Followed by  LORazepam, 1 mg, Oral, Q6H   Followed by  [START ON 7/30/2024] LORazepam, 1 mg, Oral, Q12H   Followed by  [START ON 8/1/2024] LORazepam, 1 mg, Oral, Daily  nicotine, 1 patch, Transdermal, Q24H  pantoprazole, 40 mg, Intravenous, Q12H  sodium chloride, 10 mL, Intravenous, Q12H  thiamine (B-1) IV, 200 mg, Intravenous, Q8H   Followed by  [START ON 8/3/2024] thiamine, 100 mg, Oral, Daily    Infusions  sodium chloride, 125 mL/hr, Last Rate: 125 mL/hr (07/29/24 0937)  sodium chloride, 30 mL/hr, Last Rate: 30 mL/hr (07/29/24 0928)    Diet  NPO Diet NPO Type: Strict NPO    I have personally reviewed:  [x]  Laboratory   [x]  Microbiology   [x]  Radiology   [x]  EKG/Telemetry  [x]  Cardiology/Vascular   []  Pathology    []  Records  CIWA (last 3 days)        Date/Time CIWA-Ar Score    07/29/24 0025 4     07/28/24 2017 6     07/28/24 1715 5                      Assessment/Plan     Active Hospital Problems    Diagnosis  POA    **Upper GI bleed [K92.2]  Yes    Alcohol abuse [F10.10]  Unknown    Alcoholic gastritis [K29.20]  Unknown    Fatty liver [K76.0]  Unknown    Abnormal LFTs [R79.89]  Unknown    Epigastric pain [R10.13]  Yes    Nausea [R11.0]  Yes    Attention deficit hyperactivity disorder (ADHD), combined type [F90.2]  Yes    Vapes nicotine containing substance [Z72.0]  Yes      Resolved Hospital Problems   No resolved problems to display.       24 y.o. female admitted with acute GI hemorrhage.      Patient scheduled for EGD this morning.  Continue PPI IV.  GI following.  Hemoglobin relatively stable, continue  monitoring.    In regards to fever cough suspected that she had a mild aspiration event.  Her lungs are clear but will obtain chest x-ray.  Will swab for viral respiratory pathogen's.  Hold on antibiotics for now.  Check PCT.    Continue alcohol withdrawal protocols.  [facility] following. CIWA scores 4-6.      Attention deficit hyperactivity disorder-continue home regimen and monitor.  PPI using PROTONIX 40 mg IV q12h  SCDs for DVT prophylaxis.  Full code.  Discussed with patient and family.  Anticipate discharge home with family in 1-2 days.  Expected Discharge Date: 7/30/2024; Expected Discharge Time:       Mervin Bucio MD  [facility]  07/29/24  09:44 EDT    ADDENDUM  Patient viral respiratory panel has returned positive for COVID-19.  Chest x-ray negative.  O2 sat seems stable.  Minimal risk factors so we will just treat supportively for now.    Electronically signed by Mervin Bucio MD, 07/29/24, 1:54 PM EDT.

## 2024-07-29 NOTE — ANESTHESIA PREPROCEDURE EVALUATION
Anesthesia Evaluation     Patient summary reviewed and Nursing notes reviewed                Airway   Mallampati: III  Small opening  Dental      Pulmonary - negative pulmonary ROS   Cardiovascular - negative cardio ROS    ECG reviewed  Patient on routine beta blocker  Rhythm: regular        Neuro/Psych  (+) psychiatric history ADHD, Depression and Anxiety  GI/Hepatic/Renal/Endo    (+) GI bleeding , liver disease    Musculoskeletal (-) negative ROS    Abdominal    Substance History   (+) alcohol use     OB/GYN negative ob/gyn ROS         Other                      Anesthesia Plan    ASA 2     MAC     intravenous induction     Anesthetic plan, risks, benefits, and alternatives have been provided, discussed and informed consent has been obtained with: patient, father and mother.    CODE STATUS:    Code Status (Patient has no pulse and is not breathing): CPR (Attempt to Resuscitate)  Medical Interventions (Patient has pulse or is breathing): Full Support

## 2024-07-29 NOTE — CASE MANAGEMENT/SOCIAL WORK
Discharge Planning Assessment  [facility]     Patient Name: Bernarda Randall  MRN: [id number]  Today's Date: 7/29/2024    Admit Date: 7/28/2024    Plan: Plan home with parents.   KELLI White RN   Discharge Needs Assessment       Row Name 07/29/24 1241       Living Environment    People in Home parent(s)    Name(s) of People in Home Mother  ( Eva Randall  [phone]) and Father (Олег Randall)    Current Living Arrangements home    Potentially Unsafe Housing Conditions none    In the past 12 months has the electric, gas, oil, or water company threatened to shut off services in your home? No    Primary Care Provided by self    Provides Primary Care For no one    Family Caregiver if Needed parent(s)    Family Caregiver Names Mother ( Eva Randall [phone]) and Father (Олег Randall)    Quality of Family Relationships involved;helpful;supportive    Able to Return to Prior Arrangements yes    Living Arrangement Comments Pt lives with her parents -mother ( Eva Randall [phone]) and father (Олег Randall) in a two story house.       Resource/Environmental Concerns    Resource/Environmental Concerns none    Transportation Concerns none       Transportation Needs    In the past 12 months, has lack of transportation kept you from medical appointments or from getting medications? no    In the past 12 months, has lack of transportation kept you from meetings, work, or from getting things needed for daily living? No       Food Insecurity    Within the past 12 months, you worried that your food would run out before you got the money to buy more. Never true    Within the past 12 months, the food you bought just didn't last and you didn't have money to get more. Never true       Transition Planning    Patient/Family Anticipates Transition to home with family    Patient/Family Anticipated Services at Transition none       Discharge Needs Assessment    Readmission Within the Last 30 Days no previous admission in last 30 days     Equipment Currently Used at Home none    Concerns to be Addressed no discharge needs identified;denies needs/concerns at this time    Anticipated Changes Related to Illness none    Equipment Needed After Discharge none                   Discharge Plan       Row Name 07/29/24 1243       Plan    Plan Plan home with parents.   KELLI White RN    Patient/Family in Agreement with Plan yes    Plan Comments FACE SHEET VERIFIED.  Spoke with pt and pt's mother  (Eva) at bedside. Pt's PCP is Gloria Grissom. Pt lives with her parents -mother ( Eva Randall [phone]) and father (Олег Randall) in a two story house.  Pt is independent with ADLs. Pt denies using any DMEs. Pt gets her prescriptions at [facility]).  Pt denies any issues affording medications. Pt is not current with HH. Pt has not been in SNF. Pt denies any discharge needs. Pt's parents will assist pt at home if needed and will transport her home. Plan home with parents.  KELLI White RN                  Continued Care and Services - Admitted Since 7/28/2024    No active coordination exists for this encounter.       Expected Discharge Date and Time       Expected Discharge Date Expected Discharge Time    Jul 30, 2024            Demographic Summary       Row Name 07/29/24 1240       General Information    Admission Type inpatient    Arrived From emergency department    Referral Source admission list    Reason for Consult discharge planning    Preferred Language English                   Functional Status       Row Name 07/29/24 1241       Functional Status    Usual Activity Tolerance good    Current Activity Tolerance good       Functional Status, IADL    Medications independent    Meal Preparation assistive person    Housekeeping assistive person    Laundry assistive person    Shopping assistive person       Mental Status    General Appearance WDL WDL                   Psychosocial    No documentation.                  Abuse/Neglect    No  documentation.                  Legal    No documentation.                  Substance Abuse    No documentation.                  Patient Forms    No documentation.                     Vesna White RN

## 2024-07-29 NOTE — ANESTHESIA POSTPROCEDURE EVALUATION
Patient: Bernarda Randall    Procedure Summary       Date: 07/29/24 Room / Location: [facility] ENDOSCOPY 10 / [facility] ENDOSCOPY    Anesthesia Start: 0937 Anesthesia Stop: 0952    Procedure: ESOPHAGOGASTRODUODENOSCOPY with cold biopsies (Esophagus) Diagnosis:       Upper GI bleed      (Upper GI bleed [K92.2])    Surgeons: Fran Murphy MD Provider: Hal Liang MD    Anesthesia Type: MAC ASA Status: 2            Anesthesia Type: MAC    Vitals  Vitals Value Taken Time   /106 07/29/24 1003   Temp     Pulse 94 07/29/24 1010   Resp 18 07/29/24 0952   SpO2 100 % 07/29/24 1010   Vitals shown include unfiled device data.        Post Anesthesia Care and Evaluation    Patient location during evaluation: PACU  Patient participation: complete - patient participated  Level of consciousness: awake and alert  Pain management: adequate    Airway patency: patent  Anesthetic complications: No anesthetic complications    Cardiovascular status: acceptable  Respiratory status: acceptable  Hydration status: acceptable    Comments: --------------------            07/29/24               0952     --------------------   BP:       129/84     Pulse:     101       Resp:       18       Temp:                SpO2:      99%      --------------------

## 2024-07-30 ENCOUNTER — READMISSION MANAGEMENT (OUTPATIENT)
Dept: CALL CENTER | Facility: HOSPITAL | Age: 24
End: 2024-07-30
Payer: COMMERCIAL

## 2024-07-30 ENCOUNTER — NURSE TRIAGE (OUTPATIENT)
Dept: CALL CENTER | Facility: HOSPITAL | Age: 24
End: 2024-07-30
Payer: COMMERCIAL

## 2024-07-30 ENCOUNTER — HOSPITAL ENCOUNTER (EMERGENCY)
Facility: HOSPITAL | Age: 24
Discharge: HOME | End: 2024-07-31
Attending: EMERGENCY MEDICINE
Payer: COMMERCIAL

## 2024-07-30 VITALS
BODY MASS INDEX: 25.71 KG/M2 | HEART RATE: 113 BPM | TEMPERATURE: 98.9 F | OXYGEN SATURATION: 99 % | DIASTOLIC BLOOD PRESSURE: 87 MMHG | WEIGHT: 160 LBS | SYSTOLIC BLOOD PRESSURE: 114 MMHG | RESPIRATION RATE: 14 BRPM | HEIGHT: 66 IN

## 2024-07-30 VITALS
WEIGHT: 160 LBS | RESPIRATION RATE: 18 BRPM | OXYGEN SATURATION: 98 % | BODY MASS INDEX: 25.71 KG/M2 | SYSTOLIC BLOOD PRESSURE: 130 MMHG | HEART RATE: 102 BPM | HEIGHT: 66 IN | DIASTOLIC BLOOD PRESSURE: 99 MMHG | TEMPERATURE: 98.2 F

## 2024-07-30 DIAGNOSIS — R44.3 HALLUCINATIONS: Primary | ICD-10-CM

## 2024-07-30 PROBLEM — D89.831 CYTOKINE RELEASE SYNDROME, GRADE 1: Status: ACTIVE | Noted: 2024-07-30

## 2024-07-30 LAB
ALBUMIN SERPL-MCNC: 3.7 G/DL (ref 3.5–5.2)
ALBUMIN/GLOB SERPL: 1.3 G/DL
ALP SERPL-CCNC: 93 U/L (ref 39–117)
ALT SERPL W P-5'-P-CCNC: 55 U/L (ref 1–33)
ANION GAP SERPL CALCULATED.3IONS-SCNC: 12.1 MMOL/L (ref 5–15)
AST SERPL-CCNC: 108 U/L (ref 1–32)
BILIRUB SERPL-MCNC: 2.4 MG/DL (ref 0–1.2)
BUN SERPL-MCNC: 2 MG/DL (ref 6–20)
BUN/CREAT SERPL: 3.6 (ref 7–25)
CALCIUM SPEC-SCNC: 8.4 MG/DL (ref 8.6–10.5)
CHLORIDE SERPL-SCNC: 101 MMOL/L (ref 98–107)
CO2 SERPL-SCNC: 18.9 MMOL/L (ref 22–29)
CREAT SERPL-MCNC: 0.55 MG/DL (ref 0.57–1)
DEPRECATED RDW RBC AUTO: 63.6 FL (ref 37–54)
EGFRCR SERPLBLD CKD-EPI 2021: 132.3 ML/MIN/1.73
ERYTHROCYTE [DISTWIDTH] IN BLOOD BY AUTOMATED COUNT: 17.1 % (ref 12.3–15.4)
GLOBULIN UR ELPH-MCNC: 2.9 GM/DL
GLUCOSE SERPL-MCNC: 77 MG/DL (ref 65–99)
HCT VFR BLD AUTO: 37.7 % (ref 34–46.6)
HGB BLD-MCNC: 13.3 G/DL (ref 12–15.9)
LAB AP CASE REPORT: NORMAL
MAGNESIUM SERPL-MCNC: 2.5 MG/DL (ref 1.6–2.6)
MCH RBC QN AUTO: 36 PG (ref 26.6–33)
MCHC RBC AUTO-ENTMCNC: 35.3 G/DL (ref 31.5–35.7)
MCV RBC AUTO: 102.2 FL (ref 79–97)
PATH REPORT.FINAL DX SPEC: NORMAL
PATH REPORT.GROSS SPEC: NORMAL
PLATELET # BLD AUTO: 199 10*3/MM3 (ref 140–450)
PMV BLD AUTO: 9.7 FL (ref 6–12)
POTASSIUM SERPL-SCNC: 4.2 MMOL/L (ref 3.5–5.2)
PROT SERPL-MCNC: 6.6 G/DL (ref 6–8.5)
QT INTERVAL: 306 MS
QTC INTERVAL: 457 MS
RBC # BLD AUTO: 3.69 10*6/MM3 (ref 3.77–5.28)
SODIUM SERPL-SCNC: 132 MMOL/L (ref 136–145)
WBC NRBC COR # BLD AUTO: 4.57 10*3/MM3 (ref 3.4–10.8)

## 2024-07-30 PROCEDURE — 25810000003 SODIUM CHLORIDE 0.9 % SOLUTION: Performed by: INTERNAL MEDICINE

## 2024-07-30 PROCEDURE — 25010000002 THIAMINE PER 100 MG: Performed by: INTERNAL MEDICINE

## 2024-07-30 PROCEDURE — 80053 COMPREHEN METABOLIC PANEL: CPT | Performed by: HOSPITALIST

## 2024-07-30 PROCEDURE — 25010000002 THIAMINE HCL 200 MG/2ML SOLUTION: Performed by: INTERNAL MEDICINE

## 2024-07-30 PROCEDURE — 83735 ASSAY OF MAGNESIUM: CPT | Performed by: HOSPITALIST

## 2024-07-30 PROCEDURE — G0378 HOSPITAL OBSERVATION PER HR: HCPCS

## 2024-07-30 PROCEDURE — 99282 EMERGENCY DEPT VISIT SF MDM: CPT

## 2024-07-30 PROCEDURE — 85027 COMPLETE CBC AUTOMATED: CPT | Performed by: HOSPITALIST

## 2024-07-30 RX ORDER — PANTOPRAZOLE SODIUM 40 MG/1
40 TABLET, DELAYED RELEASE ORAL
Qty: 60 TABLET | Refills: 0 | Status: SHIPPED | OUTPATIENT
Start: 2024-07-30

## 2024-07-30 RX ORDER — PANTOPRAZOLE SODIUM 40 MG/1
40 TABLET, DELAYED RELEASE ORAL
Status: DISCONTINUED | OUTPATIENT
Start: 2024-07-30 | End: 2024-07-30 | Stop reason: HOSPADM

## 2024-07-30 RX ADMIN — PANTOPRAZOLE SODIUM 40 MG: 40 INJECTION, POWDER, FOR SOLUTION INTRAVENOUS at 08:02

## 2024-07-30 RX ADMIN — Medication 10 ML: at 08:04

## 2024-07-30 RX ADMIN — PROPRANOLOL HYDROCHLORIDE 10 MG: 10 TABLET ORAL at 08:02

## 2024-07-30 RX ADMIN — DEXTROAMPHETAMINE SACCHARATE, AMPHETAMINE ASPARTATE MONOHYDRATE, DEXTROAMPHETAMINE SULFATE, AND AMPHETAMINE SULFATE 20 MG: 2.5; 2.5; 2.5; 2.5 CAPSULE ORAL at 06:44

## 2024-07-30 RX ADMIN — LORAZEPAM 1 MG: 1 TABLET ORAL at 06:44

## 2024-07-30 RX ADMIN — THIAMINE HYDROCHLORIDE 200 MG: 100 INJECTION, SOLUTION INTRAMUSCULAR; INTRAVENOUS at 12:36

## 2024-07-30 RX ADMIN — LORAZEPAM 1 MG: 1 TABLET ORAL at 12:36

## 2024-07-30 RX ADMIN — FOLIC ACID 1 MG: 1 TABLET ORAL at 08:02

## 2024-07-30 RX ADMIN — SODIUM CHLORIDE 125 ML/HR: 9 INJECTION, SOLUTION INTRAVENOUS at 02:40

## 2024-07-30 RX ADMIN — THIAMINE HYDROCHLORIDE 200 MG: 100 INJECTION, SOLUTION INTRAMUSCULAR; INTRAVENOUS at 06:44

## 2024-07-30 NOTE — PLAN OF CARE
Goal Outcome Evaluation:   Patient alert follows commands iv fluids infusing ciwa score 0-1. Scheduled ativan given. Family at bedside. No c/o pain or nausea. No acute distress noted will continue to monitor

## 2024-07-30 NOTE — NURSING NOTE
Access follow up regarding ETOH: Last CIWA score of 1. Appears to be feeling improved and progressing towards goal. In enhanced contact precautions for COVID. Pt was previously provided with [facility] resources. Pt may D/C today. [facility] therapist indicates they are to follow up prior to d/c. Following.

## 2024-07-30 NOTE — PLAN OF CARE
Goal Outcome Evaluation:  Plan of Care Reviewed With: patient           Outcome Evaluation: Enhanced contact for COVID. Denies pain or nausea. IVF's with K+ and Mag replacements. Hgb levels noted. CIWA score 0. Room air. Telemetry .

## 2024-07-30 NOTE — PROGRESS NOTES
Gastroenterology   Inpatient Progress Note    Reason for Follow Up: Hematemesis    Subjective  Interval History:   Hemoglobin stable at 13.3.  Transaminases trending down, bilirubin slightly more elevated at 2.4.  Patient tolerating oral intake, plan for discharge today.  Family at bedside.    Current Facility-Administered Medications:     amphetamine-dextroamphetamine XR (ADDERALL XR) 24 hr capsule 20 mg, 20 mg, Oral, Q12H, Maria Verdin MD, 20 mg at 07/30/24 0644    benzonatate (TESSALON) capsule 200 mg, 200 mg, Oral, TID PRN, Mervin Bucio MD    sennosides-docusate (PERICOLACE) 8.6-50 MG per tablet 2 tablet, 2 tablet, Oral, BID PRN **AND** polyethylene glycol (MIRALAX) packet 17 g, 17 g, Oral, Daily PRN **AND** bisacodyl (DULCOLAX) EC tablet 5 mg, 5 mg, Oral, Daily PRN **AND** bisacodyl (DULCOLAX) suppository 10 mg, 10 mg, Rectal, Daily PRN, Maria Verdin MD    Calcium Replacement - Follow Nurse / BPA Driven Protocol, , Does not apply, PRN, Maria Verdin MD    cloNIDine (CATAPRES) tablet 0.1 mg, 0.1 mg, Oral, Q4H PRN, Mervin Bucio MD    folic acid (FOLVITE) tablet 1 mg, 1 mg, Oral, Daily, Maria Verdin MD, 1 mg at 07/30/24 0802    LORazepam (ATIVAN) tablet 1 mg, 1 mg, Oral, Q1H PRN **OR** LORazepam (ATIVAN) injection 1 mg, 1 mg, Intravenous, Q1H PRN **OR** LORazepam (ATIVAN) tablet 2 mg, 2 mg, Oral, Q1H PRN **OR** LORazepam (ATIVAN) injection 2 mg, 2 mg, Intravenous, Q1H PRN **OR** LORazepam (ATIVAN) injection 2 mg, 2 mg, Intravenous, Q15 Min PRN **OR** LORazepam (ATIVAN) injection 2 mg, 2 mg, Intramuscular, Q15 Min PRN, Maria Verdin MD    [COMPLETED] LORazepam (ATIVAN) tablet 2 mg, 2 mg, Oral, Q6H, 2 mg at 07/29/24 1141 **FOLLOWED BY** LORazepam (ATIVAN) tablet 1 mg, 1 mg, Oral, Q6H, 1 mg at 07/30/24 0644 **FOLLOWED BY** LORazepam (ATIVAN) tablet 1 mg, 1 mg, Oral, Q12H **FOLLOWED BY** [START ON 8/1/2024] LORazepam (ATIVAN) tablet 1 mg, 1 mg, Oral, Daily, Maria Verdin MD    Magnesium Standard Dose  Replacement - Follow Nurse / BPA Driven Protocol, , Does not apply, PRN, Maria Verdin MD    morphine injection 4 mg, 4 mg, Intravenous, Q4H PRN, Maria Verdin MD    nicotine (NICODERM CQ) 21 MG/24HR patch 1 patch, 1 patch, Transdermal, Q24H, Maria Verdin MD    nicotine polacrilex (NICORETTE) gum 4 mg, 4 mg, Mouth/Throat, Q1H PRN, Maria Verdin MD    nitroglycerin (NITROSTAT) SL tablet 0.4 mg, 0.4 mg, Sublingual, Q5 Min PRN, Maria Verdin MD    ondansetron (ZOFRAN) injection 4 mg, 4 mg, Intravenous, Q6H PRN, Maria Verdin MD    pantoprazole (PROTONIX) injection 40 mg, 40 mg, Intravenous, Q12H, Maria Verdin MD, 40 mg at 07/30/24 0802    Phosphorus Replacement - Follow Nurse / BPA Driven Protocol, , Does not apply, PRN, Maria Verdin MD    Potassium Replacement - Follow Nurse / BPA Driven Protocol, , Does not apply, PRN, Maria Verdin MD    propranolol (INDERAL) tablet 10 mg, 10 mg, Oral, Q12H, Mervin Bucio MD, 10 mg at 07/30/24 0802    sodium chloride 0.9 % infusion, 30 mL/hr, Intravenous, Continuous PRN, Fran Murphy MD, Stopped at 07/29/24 1151    thiamine (B-1) injection 200 mg, 200 mg, Intravenous, Q8H, 200 mg at 07/30/24 0644 **FOLLOWED BY** [START ON 8/3/2024] thiamine (VITAMIN B-1) tablet 100 mg, 100 mg, Oral, Daily, Maria Verdin MD  Review of Systems:               The following systems were reviewed and negative;  gastrointestinal    Objective     Vital Signs  Temp:  [98.1 °F (36.7 °C)-99.1 °F (37.3 °C)] 98.1 °F (36.7 °C)  Heart Rate:  [] 94  Resp:  [16-20] 18  BP: (129-140)/() 129/101  Body mass index is 25.82 kg/m².                  Physical Exam:              General: patient awake, alert and cooperative              Eyes: no scleral icterus              Skin: warm and dry, not jaundiced              Psychiatric: Appropriate affect and behavior                Results Review:                I reviewed the patient's new clinical results.    Results from last 7 days   Lab Units  07/30/24  0505 07/29/24  1654 07/29/24  0609 07/28/24  1954 07/28/24  1107   WBC 10*3/mm3 4.57  --  3.36*  --  6.11   HEMOGLOBIN g/dL 13.3 13.3 11.8*   < > 15.1   HEMATOCRIT % 37.7 38.6 34.4   < > 44.3   PLATELETS 10*3/mm3 199  --  175  --  276    < > = values in this interval not displayed.     Results from last 7 days   Lab Units 07/30/24  0505 07/29/24 2016 07/29/24  0609 07/28/24  1153   SODIUM mmol/L 132*  --  135* 133*   POTASSIUM mmol/L 4.2 4.5 3.6 4.3   CHLORIDE mmol/L 101  --  101 96*   CO2 mmol/L 18.9*  --  22.6 25.5   BUN mg/dL 2*  --  3* 2*   CREATININE mg/dL 0.55*  --  0.68 0.63   CALCIUM mg/dL 8.4*  --  8.2* 8.6   BILIRUBIN mg/dL 2.4*  --  2.3* 1.8*   ALK PHOS U/L 93  --  85 97   ALT (SGPT) U/L 55*  --  64* 81*   AST (SGOT) U/L 108*  --  134* 193*   GLUCOSE mg/dL 77  --  79 99         Lab Results   Lab Value Date/Time    LIPASE 141 (H) 07/28/2024 1153    LIPASE 10 09/01/2022 1556    LIPASE 29 04/28/2015 1927       Radiology:  XR Chest PA & Lateral   Final Result      CT Abdomen Pelvis With Contrast   Final Result   1. Severe fatty infiltration of the liver.   2. No other significant findings are noted.           Radiation dose reduction techniques were utilized, including automated   exposure control and exposure modulation based on body size.           This report was finalized on 7/28/2024 12:49 PM by Dr. Xander Up M.D on Workstation: [id number]              Assessment & Plan     Active Hospital Problems    Diagnosis     **Upper GI bleed     COVID-19 virus infection     Alcohol abuse     Alcoholic gastritis     Fatty liver     Alcoholic hepatitis     Epigastric pain     Nausea     Attention deficit hyperactivity disorder (ADHD), combined type     Vapes nicotine containing substance        Assessment and Plan:  Hematemesis with alcoholic gastritis  No further episodes of hematemesis, hemoglobin stable  Alcoholic hepatitis, mild  Continue alcohol withdrawal  protocol  COVID-19      Plan:  Continue pantoprazole 40 mg twice daily for 4 weeks then pantoprazole 40 mg once daily  Recommend strict alcohol avoidance  Soft bland diet  Okay for discharge from GI standpoint    I discussed the patients findings and my recommendations with patient, family, and nursing staff.           Isi BRADSHAW  [facility] [place]  [address] [place] [address]

## 2024-07-30 NOTE — OUTREACH NOTE
Prep Survey      Flowsheet Row Responses   [facility] patient discharged from? [facility]   Is LACE score < 7 ? Yes   Eligibility [facility]   Date of Admission 07/29/24   Date of Discharge 07/30/24   Discharge Disposition Home or Self Care   Discharge diagnosis Nausea vomiting and abdominal pain   Does the patient have one of the following disease processes/diagnoses(primary or secondary)? Other   Does the patient have Home health ordered? No   Is there a DME ordered? No   Prep survey completed? Yes            AIRAM ATKINSON - Registered Nurse

## 2024-07-30 NOTE — PLAN OF CARE
Goal Outcome Evaluation:  Plan of Care Reviewed With: patient, father           Outcome Evaluation: Pt discharged with father as transport.  VSS, RA, NSR.  IV removed.

## 2024-07-30 NOTE — PROGRESS NOTES
Reviewed chart and spoke to pt  Educated on [facility] and co-occurring disorders Educated BHDIOP Motivational interviewing to help pt see what damage substances used has caused and how much better pt life could be abstinent  Answered all pt questions about KENDE[facility] and IOP.  Pt would like to attend [facility] IOP. Pt would like to start when Covid resolved  Gave pt flyer Access number and this therapist  Pt will call when Covid resolved to start IOP

## 2024-07-30 NOTE — CASE MANAGEMENT/SOCIAL WORK
Case Management Discharge Note      Final Note: Pt discharged home with family.   KELLI White RN         Selected Continued Care - Admitted Since 7/28/2024       Destination    No services have been selected for the patient.                Durable Medical Equipment    No services have been selected for the patient.                Dialysis/Infusion    No services have been selected for the patient.                Home Medical Care    No services have been selected for the patient.                Therapy    No services have been selected for the patient.                Community Resources    No services have been selected for the patient.                Community & DME    No services have been selected for the patient.                    Transportation Services  Private: Car    Final Discharge Disposition Code: 01 - home or self-care

## 2024-07-30 NOTE — DISCHARGE SUMMARY
Patient Name: Bernarda Randall  : 2000  MRN: [id number]    Date of Admission: 2024  Date of Discharge:  2024  Primary Care Physician: Gloria Cuellar APRN      Chief Complaint:   Abdominal Pain and Vomiting      Discharge Diagnoses     Active Hospital Problems    Diagnosis  POA    **Upper GI bleed [K92.2]  Yes    Cytokine release syndrome, grade 1 [D89.831]  No    COVID-19 virus infection [U07.1]  Yes    Alcohol abuse [F10.10]  Yes    Alcoholic gastritis [K29.20]  Yes    Fatty liver [K76.0]  Yes    Alcoholic hepatitis [K70.10]  Yes    Epigastric pain [R10.13]  Yes    Nausea [R11.0]  Yes    Attention deficit hyperactivity disorder (ADHD), combined type [F90.2]  Yes    Vapes nicotine containing substance [Z72.0]  Yes      Resolved Hospital Problems   No resolved problems to display.        Hospital Course     Ms. Randall is a 24 y.o. female with a history of ADHD, anxiety, depression and fatty liver disease who presented to [facility] initially complaining of abdominal pain and vomiting.  Please see the admitting history and physical for further details.  She was found to have tachycardia, lactic acidosis and abnormal LFTs and was admitted to the hospital for further evaluation and treatment.  She has a gastroenterologist that she has been seeing for epigastric abdominal pain.  Symptoms much worse of late prompting ER visit.  She admits to increased alcohol consumption.  Emesis was described as coffee-ground in appearance.  She was placed on IV Protonix and seen by gastroenterology who recommended and performed EGD. EGD showed reflux esophagitis but no evidence of bleeding.  Recommendation was for PPI and alcohol cessation.  H. pylori testing pending.  Her hemoglobin has remained relatively stable.  She was seen in follow-up by GI today who recommended strict alcohol avoidance, soft bland diet and pantoprazole 40 mg twice daily.  She will follow-up with her normal  gastroenterologist as outpatient.  She needs continued monitoring of her LFTs.    She was started on alcohol withdrawal protocol during hospital stay given vitamin replacement therapy.  Her CIWA scores have remained very low with no significant signs of alcohol withdrawal.  She was seen by the access center and plan is for her to follow-up here and the intensive outpatient chemical dependency program.  She also plans to get more involved and activities and volunteer work.    She had a slight cough and mild fever for which a viral respiratory panel was performed coming back positive for COVID-19.  Chest x-ray was clear and she has remained with normal O2 saturation.  She has no significant risk factors and recommended treatment for COVID-19 just supportive care only.      Day of Discharge     Subjective:  Feels better today.  No further emesis.  Feels well enough to go home and she will follow-up with intensive outpatient chemical dependency counseling.    Physical Exam:  Temp:  [98.1 °F (36.7 °C)-99.1 °F (37.3 °C)] 98.2 °F (36.8 °C)  Heart Rate:  [] 102  Resp:  [16-20] 18  BP: (129-140)/() 130/99  Body mass index is 25.82 kg/m².  Physical Exam    Consultants     Consult Orders (all) (From admission, onward)       Start     Ordered    07/29/24 0515  Inpatient Access Center Consult  Once        Provider:  (Not yet assigned)    07/29/24 0514    07/28/24 1702  Inpatient Gastroenterology Consult  Once        Specialty:  Gastroenterology  Provider:  Mahad Beyer MD    07/28/24 1701                  Procedures     ESOPHAGOGASTRODUODENOSCOPY with cold biopsies    Imaging Results (All)       Procedure Component Value Units Date/Time    XR Chest PA & Lateral [[id number]] Collected: 07/29/24 1235     Updated: 07/29/24 1239    Narrative:      XR CHEST PA AND LATERAL-     Clinical: Fever cough, vaping     COMPARISON: None     FINDINGS: Gentle T-spine scoliosis convexity towards the left. Heart  size within normal  limits. No mediastinal nor hilar abnormality. No  pleural effusion, vascular congestion or acute airspace disease is  demonstrated. No pneumothorax seen.     CONCLUSION: No active disease of the chest     This report was finalized on 7/29/2024 12:36 PM by Dr. Kal Chavez M.D on Workstation: [id number]       CT Abdomen Pelvis With Contrast [[id number]] Collected: 07/28/24 1243     Updated: 07/28/24 1252    Narrative:      CT OF THE ABDOMEN AND PELVIS WITH CONTRAST 7/28/2024     HISTORY: Abdominal pain. Hematemesis.     Spiral images were obtained from the lung bases to the symphysis pubis  after intravenous contrast. No oral contrast was given.     There is fatty infiltration of the liver. The gallbladder, spleen,  pancreas, adrenals and kidneys appear unremarkable.     No bowel wall thickening or bowel dilatation is seen. Uterus and urinary  bladder appear unremarkable.       Impression:      1. Severe fatty infiltration of the liver.  2. No other significant findings are noted.        Radiation dose reduction techniques were utilized, including automated  exposure control and exposure modulation based on body size.        This report was finalized on 7/28/2024 12:49 PM by Dr. Xander Up M.D on Workstation: [id number]          Pertinent Labs     Results from last 7 days   Lab Units 07/30/24  0505 07/29/24  1654 07/29/24  0609 07/28/24 1954 07/28/24  1107   WBC 10*3/mm3 4.57  --  3.36*  --  6.11   HEMOGLOBIN g/dL 13.3 13.3 11.8* 13.1 15.1   PLATELETS 10*3/mm3 199  --  175  --  276     Results from last 7 days   Lab Units 07/30/24  0505 07/29/24 2016 07/29/24  0609 07/28/24  1153   SODIUM mmol/L 132*  --  135* 133*   POTASSIUM mmol/L 4.2 4.5 3.6 4.3   CHLORIDE mmol/L 101  --  101 96*   CO2 mmol/L 18.9*  --  22.6 25.5   BUN mg/dL 2*  --  3* 2*   CREATININE mg/dL 0.55*  --  0.68 0.63   GLUCOSE mg/dL 77  --  79 99   EGFR mL/min/1.73 132.3  --  125.7 128.0     Results from last 7 days   Lab Units  "07/30/24  0505 07/29/24  0609 07/28/24  1153   ALBUMIN g/dL 3.7 3.3* 3.8   BILIRUBIN mg/dL 2.4* 2.3* 1.8*   ALK PHOS U/L 93 85 97   AST (SGOT) U/L 108* 134* 193*   ALT (SGPT) U/L 55* 64* 81*     Results from last 7 days   Lab Units 07/30/24  0505 07/29/24  0609 07/28/24  1153   CALCIUM mg/dL 8.4* 8.2* 8.6   ALBUMIN g/dL 3.7 3.3* 3.8   MAGNESIUM mg/dL 2.5 1.5*  --    PHOSPHORUS mg/dL  --  4.2  --      Results from last 7 days   Lab Units 07/28/24  1153   LIPASE U/L 141*             Invalid input(s): \"LDLCALC\"      Results from last 7 days   Lab Units 07/29/24  1154   COVID19  Detected*       Test Results Pending at Discharge       Discharge Details        Discharge Medications        New Medications        Instructions Start Date   pantoprazole 40 MG EC tablet  Commonly known as: PROTONIX   40 mg, Oral, 2 Times Daily Before Meals             Continue These Medications        Instructions Start Date   Adderall XR 20 MG 24 hr capsule  Generic drug: amphetamine-dextroamphetamine XR   20 mg, Oral, 3 Times Daily      Haloette 0.12-0.015 MG/24HR vaginal ring  Generic drug: etonogestrel-ethinyl estradiol       propranolol 10 MG tablet  Commonly known as: INDERAL   10 mg, Oral, 2 Times Daily PRN             Stop These Medications      Benadryl Allergy 25 MG tablet  Generic drug: diphenhydrAMINE     cetirizine 10 MG tablet  Commonly known as: zyrTEC     fluticasone 50 MCG/ACT nasal spray  Commonly known as: FLONASE     Lurasidone HCl 20 MG tablet tablet  Commonly known as: LATUDA     omeprazole 40 MG capsule  Commonly known as: priLOSEC     phenazopyridine 200 MG tablet  Commonly known as: PYRIDIUM              Allergies   Allergen Reactions    Nuts Nausea Only    Peanut-Containing Drug Products Hives       Discharge Disposition:  Home or Self Care      Discharge Diet:  Diet Order   Procedures    Diet: Gastrointestinal; Fiber-Restricted; Texture: Soft to Chew (NDD 3); Soft to Chew: Whole Meat; Fluid Consistency: Thin (IDDSI " 0)       Discharge Activity:   Activity Instructions       Activity as Tolerated              CODE STATUS:    Code Status and Medical Interventions: CPR (Attempt to Resuscitate); Full Support   Ordered at: 07/28/24 1623     Code Status (Patient has no pulse and is not breathing):    CPR (Attempt to Resuscitate)     Medical Interventions (Patient has pulse or is breathing):    Full Support       Future Appointments   Date Time Provider Department Center   9/16/2024  1:30 PM Madhavi Swartz APRN [facility]     Additional Instructions for the Follow-ups that You Need to Schedule       Discharge Follow-up with PCP   As directed       Currently Documented PCP:    Gloria Cuellar APRN    PCP Phone Number:    [phone]     Follow Up Details: 1 to 2 weeks (or sooner if problems)               Follow-up Information       Gloria Cuellar APRN .    Specialties: Nurse Practitioner, Internal Medicine  Why: 1 to 2 weeks (or sooner if problems)  Contact information:  [address]  [place] [address]  [phone]                             Additional Instructions for the Follow-ups that You Need to Schedule       Discharge Follow-up with PCP   As directed       Currently Documented PCP:    Gloria Cuellar APRN    PCP Phone Number:    [phone]     Follow Up Details: 1 to 2 weeks (or sooner if problems)            Time Spent on Discharge:  Greater than 30 minutes      Mervin Bucio MD  [facility]  07/30/24  14:17 EDT

## 2024-07-30 NOTE — NURSING NOTE
Information was faxed from Danielle Yoon and was given to pt and laid on her desk.  Pt was aware and appreciative.

## 2024-07-31 ENCOUNTER — TRANSITIONAL CARE MANAGEMENT TELEPHONE ENCOUNTER (OUTPATIENT)
Dept: CALL CENTER | Facility: HOSPITAL | Age: 24
End: 2024-07-31
Payer: COMMERCIAL

## 2024-07-31 NOTE — TELEPHONE ENCOUNTER
"Mom calls wanting to know if patient can take Latuda for she is having ETOH withdrawal hallucinations.  Patient was wanting Ativan called in for her or \"any antipsychotic Reviewed AVS with mom and patient and Latuda was on the stop list.  Instruction provided per protocol.   "

## 2024-07-31 NOTE — OUTREACH NOTE
Call Center TCM Note      Flowsheet Row Responses   [facility] patient discharged from? [place]   Does the patient have one of the following disease processes/diagnoses(primary or secondary)? Other   TCM attempt successful? Yes   Call start time 1630   Call end time 1636   Discharge diagnosis Nausea vomiting and abdominal pain, EGD showed reflux esophagitis. Alcohol abuse, Positive for COVID19   Person spoke with today (if not patient) and relationship Patient   Meds reviewed with patient/caregiver? Yes  [New: pantoprazole]   Does the patient have all medications ordered at discharge? Yes   Is the patient taking all medications as directed (includes completed medication regime)? Yes   Medication comments Patient requesting PCP to order her medication to help with alcohol cravings. She also thinks she may have a UTI. She reports urinary burning and frequency. Message routed to office.   Comments PCP Gloria BRADSHAW. Hospital follow up appt scheduled for 8/2  115pm with PCP.   Does the patient have an appointment with their PCP within 7-14 days of discharge? No   Nursing Interventions Assisted patient with making appointment per protocol, Routed TCM call to PCP office   Has home health visited the patient within 72 hours of discharge? N/A   Psychosocial issues? Yes   Psychosocial comments Patient reports that she is starting an outpt rehab program next week.   Did the patient receive a copy of their discharge instructions? Yes   Nursing interventions Reviewed instructions with patient   What is the patient's perception of their health status since discharge? Same   Is the patient/caregiver able to teach back signs and symptoms related to disease process for when to call PCP? Yes   Is the patient/caregiver able to teach back signs and symptoms related to disease process for when to call 911? Yes   Is the patient/caregiver able to teach back the hierarchy of who to call/visit for symptoms/problems? PCP,  Specialist, Home health nurse, Urgent Care, ED, 911 Yes   If the patient is a current smoker, are they able to teach back resources for cessation? Not a smoker   TCM call completed? Yes   Call end time 0616   Would this patient benefit from a Referral to [facility] Social Work? No   Is the patient interested in additional calls from an ambulatory ? No            Ame Hercules RN    7/31/2024, 16:39 EDT

## 2024-07-31 NOTE — OUTREACH NOTE
Call Center TCM Note      Flowsheet Row Responses   [facility] [place] [facility]? [place]   Does the patient have one of the following disease processes/diagnoses(primary or secondary)? Other   TCM attempt successful? No   Unsuccessful attempts Attempt 1  [Attempted to reach patient and mother, Eva, listed on PCP verbal release.]            Ame Hercules RN    7/31/2024, 16:00 EDT

## 2024-07-31 NOTE — ED PROVIDER NOTES
MD ATTESTATION NOTE    SHARED VISIT: This visit was performed by BOTH a physician and an APC. The substantive portion of the medical decision making was performed by this attesting physician who made or approved the management plan and takes responsibility for patient management. All studies documented in the APC note (if performed) were independently interpreted by me.    The HARSHA and I have discussed this patient's history, physical exam, MDM, and treatment plan.  I have reviewed the documentation and personally had a face to face interaction with the patient. The attached note describes my personal findings.      Bernarda Randall is a 24 y.o. female who presents to the ED c/o acute hallucinations.  She states that she has been seeing violence and her visual hallucinations.  No auditory hallucinations.  No thoughts of self-harm or harming others.  She is now 3 days since her last drink.  She wants to be discharged home at this time.    On exam:  GENERAL: not distressed  HENT: nares patent  EYES: no scleral icterus  CV: regular rhythm, regular rate  RESPIRATORY: normal effort  MUSCULOSKELETAL: no deformity  NEURO: alert, moves all extremities, follows commands, no tremors, not responding to internal stimuli  SKIN: warm, dry    Labs  Recent Results (from the past 24 hour(s))   CBC (No Diff)    Collection Time: 07/30/24  5:05 AM    Specimen: Blood   Result Value Ref Range    WBC 4.57 3.40 - 10.80 10*3/mm3    RBC 3.69 (L) 3.77 - 5.28 10*6/mm3    Hemoglobin 13.3 12.0 - 15.9 g/dL    Hematocrit 37.7 34.0 - 46.6 %    .2 (H) 79.0 - 97.0 fL    MCH 36.0 (H) 26.6 - 33.0 pg    MCHC 35.3 31.5 - 35.7 g/dL    RDW 17.1 (H) 12.3 - 15.4 %    RDW-SD 63.6 (H) 37.0 - 54.0 fl    MPV 9.7 6.0 - 12.0 fL    Platelets 199 140 - 450 10*3/mm3   Comprehensive Metabolic Panel    Collection Time: 07/30/24  5:05 AM    Specimen: Blood   Result Value Ref Range    Glucose 77 65 - 99 mg/dL    BUN 2 (L) 6 - 20 mg/dL    Creatinine 0.55 (L) 0.57  - 1.00 mg/dL    Sodium 132 (L) 136 - 145 mmol/L    Potassium 4.2 3.5 - 5.2 mmol/L    Chloride 101 98 - 107 mmol/L    CO2 18.9 (L) 22.0 - 29.0 mmol/L    Calcium 8.4 (L) 8.6 - 10.5 mg/dL    Total Protein 6.6 6.0 - 8.5 g/dL    Albumin 3.7 3.5 - 5.2 g/dL    ALT (SGPT) 55 (H) 1 - 33 U/L    AST (SGOT) 108 (H) 1 - 32 U/L    Alkaline Phosphatase 93 39 - 117 U/L    Total Bilirubin 2.4 (H) 0.0 - 1.2 mg/dL    Globulin 2.9 gm/dL    A/G Ratio 1.3 g/dL    BUN/Creatinine Ratio 3.6 (L) 7.0 - 25.0    Anion Gap 12.1 5.0 - 15.0 mmol/L    eGFR 132.3 >60.0 mL/min/1.73   Magnesium    Collection Time: 07/30/24  5:05 AM    Specimen: Blood   Result Value Ref Range    Magnesium 2.5 1.6 - 2.6 mg/dL       Radiology  No Radiology Exams Resulted Within Past 24 Hours    Medications given in the ED:  Medications - No data to display    Orders placed during this visit:  No orders of the defined types were placed in this encounter.      Medical Decision Making:  ED Course as of 07/31/24 0116   Wed Jul 31, 2024   0013 After talking with the patient at length she does not want to be in the emergency department or have any testing.  I have offered metabolic workup and behavioral health evaluation and she has declined.  She would like to follow-up with her psychiatrist and her PCP as an outpatient.  She is not having any homicidal or suicidal thoughts.  No indication to keep her against her will for further evaluation at this time.  Mother is at the bedside.  Patient actually started feeling much better after they left the house and almost did not sign into the ER at all.  I made it clear that she can return to the ER at any time for further evaluation or if she changes her mind or has any worsening symptoms or concerns. [KA]      ED Course User Index  [KA] Sophia Ha PA-C       Differential diagnosis:  Delirium tremens, alcoholic hallucinosis, schizophrenia    The patient's symptoms seem to be a little bit late for this to be alcoholic  hallucinosis.  She does not show any signs of delirium tremens.    We offered her psychiatry evaluation.  However she declines.  She already has an established psychiatrist.  I think follow-up with that psychiatrist is clinically appropriate.  I see no need for the patient to be admitted for psychiatric concerns at this time as she has good support with her mother and denies having any SI or HI.    Diagnosis  Final diagnoses:   Hallucinations          Rl Martinez II, MD  07/31/24 0119

## 2024-07-31 NOTE — TELEPHONE ENCOUNTER
"Reason for Disposition   Condition / symptoms SAME (not improving)    Additional Information   Negative: Sounds like a life-threatening emergency to the triager   Negative: Discharged in past month from the hospital with a diagnosis of chronic obstructive pulmonic disease (COPD)   Negative: Discharged in past month from the hospital with a diagnosis of congestive heart failure (CHF) or heart failure (HF)   Negative: Discharged in past month from the hospital with a diagnosis of heart attack (myocardial infarction)   Negative: Discharged in past month from the hospital with a diagnosis of pneumonia   Negative: Taking antibiotic for cellulitis (follow-up call)   Negative: Taking antibiotic for other infection (follow-up call)   Negative: [1] Post-op AND [2] incision symptoms or questions   Negative: [1] Post-op AND [2] general symptoms or questions   Negative: Pain, redness, swelling, or pus at IV Site   Negative: IV not running or running slowly   Negative: Symptoms arising from use of a urinary catheter (e.g., Coude, Cuadra)   Negative: Medication question   Negative: New-onset fever   Negative: [1] New symptom AND [2] not a possible complication of hospitalized condition   Negative: Patient sounds very sick or weak to the triager   Negative: Sounds like a serious complication to the triager   Negative: Condition / symptoms WORSE   Negative: [1] Caller has URGENT question AND [2] triager unable to answer question   Negative: [1] Discharged from hospital within this past week AND [2] taking Coumadin (warfarin) AND [3] no INR testing performed within 5 days of discharge    Answer Assessment - Initial Assessment Questions  1. MAIN CONCERN OR SYMPTOM:  \"What is your main concern right now?\" \"What question do you have?\" \"What's the main symptom you're worried about?\" (e.g., breathing difficulty, ankle swelling, weight gain.)      Hallucinations due to ETOH withdrawal  2. ONSET: \"When did the  hallucinations  start?\"      " "This pm  3. BETTER-SAME-WORSE: \"Are you getting better, staying the same, or getting worse compared to the day you were discharged?\"      worse  4. HOSPITALIZATION: \"How long were you hospitalized?\" (e.g., days)      days  5. DISCHARGE DIAGNOSIS:  \"What problem or disease were you hospitalized for?\"      Upper GI Bleed  6. DISCHARGE DATE: \"What date were you discharged from the hospital?\"      07/30/24  7. DISCHARGE DOCTOR: \"Who is the main doctor taking care of you now?\"         8. DISCHARGE APPOINTMENT: \"Have you scheduled a follow-up discharge appointment with your doctor?\"      Need to schedule  9. DISCHARGE MEDICINES: \"Did the doctor (or NP/PA) who discharged you order any new medicines for you to use? If yes, have you filled the prescription and started taking the medicine?\"       Protonix   10. PAIN: \"Is there any pain?\" If Yes, ask: \"How bad is it?\"  (Scale 0-10; or mild, moderate, severe)    - NONE (0): no pain    - MILD (1-3): doesn't interfere with normal activities    - MODERATE (4-7): interferes with normal activities (e.g., work or school) or awakens from sleep    - SEVERE (8-10): excruciating pain, unable to do any normal activities        NA  11. FEVER: \"Do you have a fever?\" If Yes, ask: \"What is it, how was it measured  and when did it start?\"        NA  12. OTHER SYMPTOMS: \"Do you have any other symptoms?\"        Hallucinations    Protocols used: Post-Hospitalization Follow-up Call-ADULT-    "

## 2024-07-31 NOTE — ED TRIAGE NOTES
Pt ambulatory to triage from home with c/o hallucinations.  Pt just released from [facility] after admission for etoh withdrawal and abdominal pain.  Pt states last drink Saturday night, denies any other illicit drug usage.  Pupils dilated.

## 2024-07-31 NOTE — ED PROVIDER NOTES
EMERGENCY DEPARTMENT ENCOUNTER  Room Number:  06/06  PCP: Gloria Cuellar APRN  Independent Historians: Patient      HPI:  Chief Complaint: had concerns including Hallucinations.       A complete HPI/ROS/PMH/PSH/SH/FH are unobtainable due to: None    Chronic or social conditions impacting patient care (Social Determinants of Health): None      Context: Bernarda Randall is a 24 y.o. female with a medical history of alcohol abuse, alcoholic gastritis, alcoholic hepatitis, fatty liver who presents to the ED c/o acute hallucinations.  Mom is at the bedside.  Patient states that she was hallucinating that there were people in the garage and mom states that were not.  She is also heard music when there was not any and thought a door was opening when it was not.  She was admitted and discharged from the hospital yesterday for alcohol withdraw vomiting and abdominal pain.  She is concerned is related to alcohol withdrawal.  Her last drink was greater than 4 days ago.  She denies any drug use.  She denies any fevers chills chest pain shortness of breath abdominal pain or urinary symptoms.      Review of prior external notes (non-ED) -and- Review of prior external test results outside of this encounter:  Patient was admitted 7/28/2024 to 7/30/2024 for abdominal pain and vomiting.  Found to have tachycardia lactic acidosis and abnormal LFTs.  Had coffee-ground emesis, given Protonix, EGD performed showed reflux esophagitis but no sign of bleeding, PPI and alcohol cessation recommended.  Hemoglobin remained stable.  CIWA scores remain low with no significant signs of alcohol withdrawal, was seen by psychiatry and recommended follow-up with intensive outpatient chemical dependency program.  Had mild URI symptoms, COVID-19 positive, chest x-ray was clear.        PAST MEDICAL HISTORY  Active Ambulatory Problems     Diagnosis Date Noted    Anxiety 05/02/2024    Attention deficit hyperactivity disorder (ADHD), combined type  05/02/2024    Vapes nicotine containing substance 05/02/2024    Epigastric pain 05/15/2024    Nausea 05/15/2024    Diarrhea 05/15/2024    Upper GI bleed 07/28/2024    Alcohol abuse 07/28/2024    Alcoholic gastritis 07/28/2024    Fatty liver 07/28/2024    Alcoholic hepatitis 07/28/2024    COVID-19 virus infection 07/29/2024    Cytokine release syndrome, grade 1 07/30/2024     Resolved Ambulatory Problems     Diagnosis Date Noted    No Resolved Ambulatory Problems     Past Medical History:   Diagnosis Date    ADHD (attention deficit hyperactivity disorder)     Depression          PAST SURGICAL HISTORY  Past Surgical History:   Procedure Laterality Date    EAR TUBES      ENDOSCOPY N/A 7/29/2024    Procedure: ESOPHAGOGASTRODUODENOSCOPY with cold biopsies;  Surgeon: Fran Murphy MD;  Location: [facility] ENDOSCOPY;  Service: Gastroenterology;  Laterality: N/A;  pre: hematemesis  post: esophagitis, gastritis         FAMILY HISTORY  Family History   Problem Relation Age of Onset    Colon polyps Mother     Hypertension Mother     Hyperlipidemia Mother     Endometriosis Mother     Colon polyps Father     Diabetes Father     Cancer Maternal Aunt     Heart attack Maternal Aunt     Liver disease Maternal Grandmother     Heart disease Maternal Grandfather     Breast cancer Maternal Great-Grandmother     Colon cancer Neg Hx     Crohn's disease Neg Hx     Irritable bowel syndrome Neg Hx     Ulcerative colitis Neg Hx          SOCIAL HISTORY  Social History     Socioeconomic History    Marital status: Single   Tobacco Use    Smoking status: Never    Smokeless tobacco: Never   Vaping Use    Vaping status: Every Day    Substances: Nicotine    Devices: Disposable   Substance and Sexual Activity    Alcohol use: Yes     Comment: on weekends    Drug use: Never    Sexual activity: Not Currently         ALLERGIES  Nuts and Peanut-containing drug products      REVIEW OF SYSTEMS  Review of Systems  Included in HPI  All systems reviewed and  negative except for those discussed in HPI.      PHYSICAL EXAM    I have reviewed the triage vital signs and nursing notes.    ED Triage Vitals [07/30/24 2252]   Temp Heart Rate Resp BP SpO2   98.9 °F (37.2 °C) (!) 128 14 136/92 98 %      Temp src Heart Rate Source Patient Position BP Location FiO2 (%)   Tympanic Monitor Sitting Right arm --       Physical Exam  GENERAL: alert, anxious appearing, no distress  SKIN: Warm, dry  HENT: Normocephalic, atraumatic  EYES: no scleral icterus  CV: regular rhythm, regular rate  RESPIRATORY: normal effort, lungs clear  ABDOMEN: nondistended soft nontender  MUSCULOSKELETAL: no deformity  NEURO: alert, oriented x 4, moves all extremities, follows commands            LAB RESULTS  Recent Results (from the past 24 hour(s))   CBC (No Diff)    Collection Time: 07/30/24  5:05 AM    Specimen: Blood   Result Value Ref Range    WBC 4.57 3.40 - 10.80 10*3/mm3    RBC 3.69 (L) 3.77 - 5.28 10*6/mm3    Hemoglobin 13.3 12.0 - 15.9 g/dL    Hematocrit 37.7 34.0 - 46.6 %    .2 (H) 79.0 - 97.0 fL    MCH 36.0 (H) 26.6 - 33.0 pg    MCHC 35.3 31.5 - 35.7 g/dL    RDW 17.1 (H) 12.3 - 15.4 %    RDW-SD 63.6 (H) 37.0 - 54.0 fl    MPV 9.7 6.0 - 12.0 fL    Platelets 199 140 - 450 10*3/mm3   Comprehensive Metabolic Panel    Collection Time: 07/30/24  5:05 AM    Specimen: Blood   Result Value Ref Range    Glucose 77 65 - 99 mg/dL    BUN 2 (L) 6 - 20 mg/dL    Creatinine 0.55 (L) 0.57 - 1.00 mg/dL    Sodium 132 (L) 136 - 145 mmol/L    Potassium 4.2 3.5 - 5.2 mmol/L    Chloride 101 98 - 107 mmol/L    CO2 18.9 (L) 22.0 - 29.0 mmol/L    Calcium 8.4 (L) 8.6 - 10.5 mg/dL    Total Protein 6.6 6.0 - 8.5 g/dL    Albumin 3.7 3.5 - 5.2 g/dL    ALT (SGPT) 55 (H) 1 - 33 U/L    AST (SGOT) 108 (H) 1 - 32 U/L    Alkaline Phosphatase 93 39 - 117 U/L    Total Bilirubin 2.4 (H) 0.0 - 1.2 mg/dL    Globulin 2.9 gm/dL    A/G Ratio 1.3 g/dL    BUN/Creatinine Ratio 3.6 (L) 7.0 - 25.0    Anion Gap 12.1 5.0 - 15.0 mmol/L    eGFR  132.3 >60.0 mL/min/1.73   Magnesium    Collection Time: 07/30/24  5:05 AM    Specimen: Blood   Result Value Ref Range    Magnesium 2.5 1.6 - 2.6 mg/dL         RADIOLOGY  No Radiology Exams Resulted Within Past 24 Hours      MEDICATIONS GIVEN IN ER  Medications - No data to display      ORDERS PLACED DURING THIS VISIT:  No orders of the defined types were placed in this encounter.        OUTPATIENT MEDICATION MANAGEMENT:  No current Epic-ordered facility-administered medications on file.     Current Outpatient Medications Ordered in Epic   Medication Sig Dispense Refill    amphetamine-dextroamphetamine XR (Adderall XR) 20 MG 24 hr capsule Take 1 capsule by mouth 3 (Three) Times a Day      Halo[place] 0.12-0.015 MG/24HR vaginal ring       pantoprazole (PROTONIX) 40 MG EC tablet Take 1 tablet by mouth 2 (Two) Times a Day Before Meals. 60 tablet 0    propranolol (INDERAL) 10 MG tablet Take 1 tablet by mouth 2 (Two) Times a Day As Needed (anxiety). 60 tablet 5         PROCEDURES  Procedures            PROGRESS, DATA ANALYSIS, CONSULTS, AND MEDICAL DECISION MAKING  All labs have been independently interpreted by me.  All radiology studies have been reviewed by me. All EKG's have been independently viewed and interpreted by me.  Discussion below represents my analysis of pertinent findings related to patient's condition, differential diagnosis, treatment plan and final disposition.    DIFFERENTIAL    My differential diagnosis includes but is not limited to psychosis, medication side effect, alcohol withdrawal    Clinical Scores:                  ED Course as of 07/31/24 0248   Wed Jul 31, 2024   0013 After talking with the patient at length she does not want to be in the emergency department or have any testing.  I have offered metabolic workup and behavioral health evaluation and she has declined.  I do not believe her symptoms are related to alcohol withdrawal she has not had a drink in 4 days and while admitted her [facility]  scores were low and she did not have significant withdrawal symptoms.  She would like to follow-up with her psychiatrist and her PCP as an outpatient.  She is not having any homicidal or suicidal thoughts.  No indication to keep her against her will for further evaluation at this time.  Mother is at the bedside.  Patient actually started feeling much better after they left the house and almost did not sign into the ER at all.  I made it clear that she can return to the ER at any time for further evaluation or if she changes her mind or has any worsening symptoms or concerns. [KA]      ED Course User Index  [KA] Sophia Ha PA-C             AS OF 02:48 EDT VITALS:    BP - 114/87  HR - 113  TEMP - 98.9 °F (37.2 °C) (Tympanic)  O2 SATS - 99%    COMPLEXITY OF CARE  Admission was considered but after careful review of the patient's presentation, physical examination, diagnostic results, and response to treatment the patient may be safely discharged with outpatient follow-up.      DIAGNOSIS  Final diagnoses:   Hallucinations         DISPOSITION  ED Disposition       ED Disposition   Discharge    Condition   Good    Comment   --                  FOLLOW UP  Gloria Cuellar, APRDAVON  [address] [id number] [phone]          Your psychiatrist    In 1 day          Prescribed Medications     Medication List      No changes were made to your prescriptions during this visit.                   Please note that portions of this document were completed with a voice recognition program.    Note Disclaimer: At [facility], we believe that sharing information builds trust and better relationships. You are receiving this note because you recently visited [facility]. It is possible you will see health information before a provider has talked with you about it. This kind of information can be easy to misunderstand. To help you fully understand what it means for your health, we urge you to discuss  this note with your provider.         Sophia Ha PA-C  07/31/24 0244

## 2024-08-02 ENCOUNTER — OFFICE VISIT (OUTPATIENT)
Dept: INTERNAL MEDICINE | Facility: CLINIC | Age: 24
End: 2024-08-02
Payer: COMMERCIAL

## 2024-08-02 VITALS
HEIGHT: 66 IN | DIASTOLIC BLOOD PRESSURE: 70 MMHG | OXYGEN SATURATION: 99 % | HEART RATE: 100 BPM | WEIGHT: 167.3 LBS | BODY MASS INDEX: 26.89 KG/M2 | SYSTOLIC BLOOD PRESSURE: 108 MMHG | TEMPERATURE: 98.2 F

## 2024-08-02 DIAGNOSIS — F10.90 ALCOHOL USE DISORDER: ICD-10-CM

## 2024-08-02 DIAGNOSIS — K29.20 ACUTE ALCOHOLIC GASTRITIS WITHOUT HEMORRHAGE: ICD-10-CM

## 2024-08-02 DIAGNOSIS — Z09 HOSPITAL DISCHARGE FOLLOW-UP: Primary | ICD-10-CM

## 2024-08-02 DIAGNOSIS — K70.10 ALCOHOLIC HEPATITIS WITHOUT ASCITES: ICD-10-CM

## 2024-08-02 RX ORDER — NALTREXONE HYDROCHLORIDE 50 MG/1
50 TABLET, FILM COATED ORAL DAILY
Qty: 30 TABLET | Refills: 5 | Status: SHIPPED | OUTPATIENT
Start: 2024-08-02

## 2024-08-02 RX ORDER — FOLIC ACID 1 MG/1
1 TABLET ORAL DAILY
Qty: 90 TABLET | Refills: 1 | Status: SHIPPED | OUTPATIENT
Start: 2024-08-02

## 2024-08-02 NOTE — PROGRESS NOTES
Subjective   Bernarda Randall is a 24 y.o. female.     Chief Complaint   Patient presents with    Hospital Follow Up Visit        History of Present Illness   She is here today for hospital follow-up.  TCM note reviewed by me today.  She presented to [facility] ER on 7/28 with complaints of abdominal pain and vomiting.  She was found to be tachycardic with abnormal LFTs and lactic acidosis.  She was admitted for further evaluation and treatment.  She admitted to increased alcohol consumption prior to ER arrival.  She noted emesis was described as coffee-ground in appearance.  CT scan of the abdomen and pelvis showed severe fatty infiltration of the liver.  She was started on IV Protonix and evaluated by GI who performed an EGD showing reflux esophagitis without evidence of bleeding.  Recommendations included continuing PPI and alcohol cessation.  H. pylori testing was completed.  She was started on alcohol withdrawal protocol during hospitalization and given vitamin replacement therapy.  Her CIWA scores remained low without any significant signs of alcohol withdrawal.  [facility] and the plan is for her to follow-up with intensive outpatient chemical dependency program.  She also tested positive for COVID during hospitalization and only required supportive care.  Chest x-ray was negative.  Lab work showed mild anemia with hemoglobin down to 11.8.  Ethanol was elevated at 68.  Liver enzymes were elevated but trending downward during hospitalization.  She was discharged from the hospital on 7/30 to follow-up with GI and PCP and to start IOP program.  She then presented to the [facility] ER on 7/30 with complaints of acute visual and auditory hallucinations.  After evaluation in the ER the hallucinations subsided.  She was offered psychiatric evaluation but declined.  She then again presented to the ER at [facility] on [address] on 7/31 with complaints of dysuria and concern for alcohol  withdrawal.  Urinalysis was negative.  She was prescribed a tapered dose of Ativan for alcohol withdrawal and discharged to follow-up outpatient.  She presents today for follow up. She is feeling better today. She notes last alcohol consumption was on 07/30, two mini bottles of wine after developing the hallucinations.  She denies any alcohol since then.  She denies any visual or auditory hallucinations since the 30th of July. She notes some intermittent tremors in her hands with purposeful movements as well as myalgias. She feels slightly off balance. She denies any acute confusion, seizures or syncope. She is currently living with her parents. They are in the process of removing all of the alcohol from her home.  She notes improvement in upper abdominal pain and nausea.  She denies any vomiting, hematemesis, dysphagia, odynophagia, BRBPR or melena.  She is taking the pantoprazole 40 mg twice daily.  She has not yet scheduled her follow-up with gastroenterology.  She notes a history of increased alcohol consumption beginning in February. She notes to drinking on average a fifth of liquor a day. She notes she would often reach for alcohol to help with anxiety, stress relief and sleep.  She is currently on an Ativan taper.  She does note some alcohol cravings currently.  She is not currently on folic acid supplementation.  She is taking the propranolol 10 mg twice daily as needed with improvement in anxiety.  She was previously seeing a behavioral health specialist in [place] who is currently managing her Adderall prescription.  She is planning on establishing with a behavioral health specialist through her IOP program.  She is starting an IOP program with [facility] on Monday.     The following portions of the patient's history were reviewed and updated as appropriate: allergies, current medications, past family history, past medical history, past social history, past surgical history, and problem  list.    Review of Systems   Constitutional:  Positive for fatigue. Negative for chills and fever.   Respiratory: Negative.     Cardiovascular: Negative.    Gastrointestinal:  Positive for abdominal distention and abdominal pain. Negative for anal bleeding, blood in stool, constipation, diarrhea, nausea, vomiting and GERD.   Musculoskeletal:  Negative for gait problem.   Neurological:  Positive for tremors. Negative for dizziness, seizures, syncope, facial asymmetry, speech difficulty, weakness, light-headedness, numbness, headache, memory problem and confusion.   Psychiatric/Behavioral:  Positive for sleep disturbance and stress. Negative for agitation, hallucinations, self-injury, suicidal ideas and depressed mood. The patient is nervous/anxious.        Objective   Physical Exam  Constitutional:       Appearance: She is well-developed.   Neck:      Thyroid: No thyroid mass, thyromegaly or thyroid tenderness.      Vascular: No carotid bruit.      Trachea: Trachea normal.   Cardiovascular:      Rate and Rhythm: Normal rate and regular rhythm.      Chest Wall: PMI is not displaced.      Pulses:           Radial pulses are 2+ on the right side and 2+ on the left side.        Dorsalis pedis pulses are 2+ on the right side and 2+ on the left side.        Posterior tibial pulses are 2+ on the right side and 2+ on the left side.      Heart sounds: S1 normal and S2 normal.   Pulmonary:      Effort: Pulmonary effort is normal.      Breath sounds: Normal breath sounds.   Abdominal:      General: Bowel sounds are normal. There is no distension or abdominal bruit. There are no signs of injury.      Palpations: Abdomen is soft. There is hepatomegaly. There is no splenomegaly.      Tenderness: There is abdominal tenderness in the right upper quadrant, epigastric area and left upper quadrant. There is no guarding or rebound.      Hernia: No hernia is present.   Musculoskeletal:      Right lower leg: No edema.      Left lower leg:  No edema.   Lymphadenopathy:      Head:      Right side of head: No submental, submandibular, tonsillar or occipital adenopathy.      Left side of head: No submental, submandibular, tonsillar or occipital adenopathy.      Cervical: No cervical adenopathy.   Skin:     General: Skin is warm and dry.      Capillary Refill: Capillary refill takes less than 2 seconds.      Nails: There is no clubbing.   Neurological:      Mental Status: She is alert and oriented to person, place, and time.      Cranial Nerves: Cranial nerves 2-12 are intact.      Sensory: Sensation is intact.      Motor: Tremor (Fine tremor in hands bilaterally with purposeful movement.) present.      Coordination: Coordination is intact.      Gait: Gait is intact.      Deep Tendon Reflexes:      Reflex Scores:       Patellar reflexes are 2+ on the right side and 2+ on the left side.  Psychiatric:         Attention and Perception: Attention and perception normal.         Mood and Affect: Mood normal. Affect is flat.         Speech: Speech normal.         Behavior: Behavior normal. Behavior is cooperative.         Thought Content: Thought content normal.         Cognition and Memory: Cognition and memory normal.         Judgment: Judgment normal.         Vitals:    08/02/24 1328   BP: 108/70   Pulse: 100   Temp: 98.2 °F (36.8 °C)   SpO2: 99%      Body mass index is 27.02 kg/m².    Assessment & Plan   Problems Addressed this Visit       Alcoholic gastritis    Alcoholic hepatitis    Relevant Orders    Comprehensive Metabolic Panel     Other Visit Diagnoses       Hospital discharge follow-up    -  Primary    Alcohol use disorder        Relevant Medications    folic acid (FOLVITE) 1 MG tablet    naltrexone (DEPADE) 50 MG tablet    Other Relevant Orders    Comprehensive Metabolic Panel          Diagnoses         Codes Comments    Hospital discharge follow-up    -  Primary ICD-10-CM: Z09  ICD-9-CM: V67.59     Alcohol use disorder     ICD-10-CM:  F10.90  ICD-9-CM: V49.89     Acute alcoholic gastritis without hemorrhage     ICD-10-CM: K29.20  ICD-9-CM: 535.30     Alcoholic hepatitis without ascites     ICD-10-CM: K70.10  ICD-9-CM: 571.1           1.  Hospital discharge follow-up for alcoholic gastritis and alcoholic hepatitis-patient is feeling better today.  Emphasized the importance of her to abstain from alcohol.  Recommend continuing pantoprazole 40 mg twice daily.  She is planning to start IOP program on Monday.  Recommend continuing Ativan taper for alcohol withdrawal.  Will recheck liver function testing today to ensure it is continuing to take trend downward.  Instructed her to contact GI today to schedule follow-up visit.  Discussed with her to be seen emergently in the ER for any acute neurologic changes, severe abdominal pain, hematemesis or rectal bleeding.  2.  Alcohol use disorder-will start naltrexone to 50 mg daily to help with cravings.  I have also prescribed folic acid 1 mg daily to start.  She will continue the Ativan taper for alcohol withdrawal.  Emphasized the importance of her to abstain from all alcohol use.  She has a good support system at home with her parents who have removed alcohol from the home.  She is starting the IOP program on Monday.  We will plan to follow-up in the office in 6 weeks for follow-up or sooner as needed.  Discussed with her red flag symptoms to be seen emergently for.

## 2024-08-03 LAB
ALBUMIN SERPL-MCNC: 4 G/DL (ref 3.5–5.2)
ALBUMIN/GLOB SERPL: 1.8 G/DL
ALP SERPL-CCNC: 91 U/L (ref 39–117)
ALT SERPL-CCNC: 46 U/L (ref 1–33)
AST SERPL-CCNC: 77 U/L (ref 1–32)
BILIRUB SERPL-MCNC: 1.1 MG/DL (ref 0–1.2)
BUN SERPL-MCNC: 5 MG/DL (ref 6–20)
BUN/CREAT SERPL: 7.8 (ref 7–25)
CALCIUM SERPL-MCNC: 9.2 MG/DL (ref 8.6–10.5)
CHLORIDE SERPL-SCNC: 106 MMOL/L (ref 98–107)
CO2 SERPL-SCNC: 22.4 MMOL/L (ref 22–29)
CREAT SERPL-MCNC: 0.64 MG/DL (ref 0.57–1)
EGFRCR SERPLBLD CKD-EPI 2021: 127.5 ML/MIN/1.73
GLOBULIN SER CALC-MCNC: 2.2 GM/DL
GLUCOSE SERPL-MCNC: 99 MG/DL (ref 65–99)
POTASSIUM SERPL-SCNC: 4.6 MMOL/L (ref 3.5–5.2)
PROT SERPL-MCNC: 6.2 G/DL (ref 6–8.5)
SODIUM SERPL-SCNC: 141 MMOL/L (ref 136–145)

## 2024-08-05 ENCOUNTER — OFFICE VISIT (OUTPATIENT)
Dept: PSYCHIATRY | Facility: HOSPITAL | Age: 24
End: 2024-08-05
Payer: COMMERCIAL

## 2024-08-05 DIAGNOSIS — K70.10 ALCOHOLIC HEPATITIS WITHOUT ASCITES: ICD-10-CM

## 2024-08-05 DIAGNOSIS — F19.90 POLYSUBSTANCE USE DISORDER: Primary | ICD-10-CM

## 2024-08-05 DIAGNOSIS — E80.6 HYPERBILIRUBINEMIA: Primary | ICD-10-CM

## 2024-08-05 PROCEDURE — H0015 ALCOHOL AND/OR DRUG SERVICES: HCPCS | Performed by: SOCIAL WORKER

## 2024-08-05 NOTE — PROGRESS NOTES
[id number] KENDELL-IOP Class     Class topic: anxiety    Class participation: good; pt actively engaged and could relate to topic; pt processed anxiety associated with not being able to predict the outcome of a situation.

## 2024-08-05 NOTE — PROGRESS NOTES
"CD IOP Group note  Observations:    Engaged in Activity / Process and Self -disclosed: Yes  Applies Topic to self: Yes  Able to give Constructive Feedback: Yes  Affect: anxious  Degree of Insightful Thinking 5  Notes:  Pt processed anxiety.Pt processed  chronological of her drinking. Pt able to verbalize understanding that KENDELL is progressive disorder. Pt has \"no\" ANTONINA Yoon, YADY            "

## 2024-08-05 NOTE — PROGRESS NOTES
"CD IOP Group note  Observations:    Engaged in Activity / Process and Self -disclosed: Yes  Applies Topic to self: Yes  Able to give Constructive Feedback: Yes  Affect: anxious  Degree of Insightful Thinking 4  Notes:  Pt assisted with development and processing today's sobriety plan   Pt has \"no\" ANTONINA Yoon LCSW            "

## 2024-08-06 ENCOUNTER — OFFICE VISIT (OUTPATIENT)
Dept: PSYCHIATRY | Facility: HOSPITAL | Age: 24
End: 2024-08-06
Payer: COMMERCIAL

## 2024-08-06 DIAGNOSIS — F19.90 POLYSUBSTANCE USE DISORDER: Primary | ICD-10-CM

## 2024-08-06 PROCEDURE — H0015 ALCOHOL AND/OR DRUG SERVICES: HCPCS | Performed by: SOCIAL WORKER

## 2024-08-06 NOTE — PROGRESS NOTES
"CD IOP Group note  Observations:    Engaged in Activity / Process and Self -disclosed: Yes  Applies Topic to self: Yes  Able to give Constructive Feedback: Yes  Affect: anxious  Degree of Insightful Thinking 5  Notes:  [id number]  Pt participated in morning meditations. Pt could verbalize her relating of hope for recovery. Pt has \"no\" ANTONINA Yoon, YADY            "

## 2024-08-06 NOTE — PROGRESS NOTES
Teaching Record:  Information / activity:  Expressive Therapy  [id number] Art Therapy - Used markers on paper to first make a quick scribble and then use this to create an image of a creature representing the illness of addiction; Introduced the creature to the group describing it and then naming the creature; Then, created four scenes of a story showing interaction between self and the creature. Shared the story with the group and processed the task.    Good participation  Pt focused on the task, shared with peers and accepted peer feedback. Pt described addiction as coercing pt into addictive behaviors even after pt and others recognized use had become a problem. Pt shared knowing that addiction will be in live and wants to have it controlled.       Cole Hyatt, LPAT

## 2024-08-06 NOTE — PROGRESS NOTES
"CD IOP Group note  Observations:    Engaged in Activity / Process and Self -disclosed: Yes  Applies Topic to self: Yes  Able to give Constructive Feedback: Yes  Affect: anxious  Degree of Insightful Thinking 5  Notes:  Pt processed anxiety and sadness. Pt shared her KENDELL progression since 2022. Pt has medical issues caused by ETOH. Pt educated what it means to have a fatty liver. Pt shared her triggers and cravings being high. Pt has \"no\" ANTONINA.      Danielle Yoon, YADY            "

## 2024-08-07 ENCOUNTER — OFFICE VISIT (OUTPATIENT)
Dept: PSYCHIATRY | Facility: HOSPITAL | Age: 24
End: 2024-08-07
Payer: COMMERCIAL

## 2024-08-07 DIAGNOSIS — F19.90 POLYSUBSTANCE USE DISORDER: Primary | ICD-10-CM

## 2024-08-07 PROCEDURE — H0015 ALCOHOL AND/OR DRUG SERVICES: HCPCS | Performed by: SOCIAL WORKER

## 2024-08-07 NOTE — PROGRESS NOTES
Teaching Record:  Information / activity:  Stages of Family Recovery - Family Program    Good participation    [id number]      Danielle Yoon, LCSW

## 2024-08-07 NOTE — PROGRESS NOTES
"CD IOP Group note  Observations:    Engaged in Activity / Process and Self -disclosed: Yes  Applies Topic to self: Yes  Able to give Constructive Feedback: Yes  Affect: anxious  Degree of Insightful Thinking 6  Notes:  [id number]  Assisted pt with development of and processing her sobriety plan for today. Pt has \"no\" SI.      Danielle Yoon, ADELSOW            "

## 2024-08-07 NOTE — PROGRESS NOTES
"CD IOP Group note  Observations:    Engaged in Activity / Process and Self -disclosed: Yes  Applies Topic to self: Yes  Able to give Constructive Feedback: Yes  Affect: anxious  Degree of Insightful Thinking 5  Notes:  [id number]  Pt brought a friend to family program today. Pt processed how having KENDELL is feeling helpless, hopeless, and shame. Pt educated on brain disease of KENDELL and co-occurring disorders.  Pt processed her remorse for her dishonesty during active KENDELL. Pt has \"no\" SI      Danielle Yoon, YADY            "

## 2024-08-07 NOTE — TREATMENT PLAN
Multi-Disciplinary Problems (from Behavioral Health Treatment Plan)      Active Problems       Problem: Substance Abuse Issues  Start Date: 08/07/24      Problem Details: The patient self-scales this problem as a 9 with 10 being the worst.          Goal Priority Start Date Expected End Date End Date    Patient will abstain from mood altering substances. -- 08/07/24 02/05/25 --    Goal Details: Progress toward goal:  Not appropriate to rate progress toward goal since this is the initial treatment plan.          Goal Intervention Frequency Start Date End Date    Provide education to increase patients understanding of addiction and relapse processes. Weekly 08/07/24 --    Intervention Details: Duration of treatment until discharged.          Goal Priority Start Date Expected End Date End Date    Patient will develop insight into how to improve their relationships. -- 08/07/24 02/05/25 --    Goal Details: Progress toward goal:  Not appropriate to rate progress toward goal since this is the initial treatment plan.          Goal Intervention Frequency Start Date End Date    Educate about 12 step recovery programs. Weekly 08/07/24 --    Intervention Details: Duration of treatment until discharged.          Goal Priority Start Date Expected End Date End Date    Patient will improve positive self regard. -- 08/07/24 02/05/25 --    Goal Details: Progress toward goal:  Not appropriate to rate progress toward goal since this is the initial treatment plan.          Goal Intervention Frequency Start Date End Date    Educate patient about how substance use affects their relationships. Weekly 08/07/24 --    Intervention Details: Duration of treatment until discharged.                                 I have discussed and reviewed this treatment plan with the patient.  It has been printed for signatures.

## 2024-08-08 ENCOUNTER — OFFICE VISIT (OUTPATIENT)
Dept: PSYCHIATRY | Facility: HOSPITAL | Age: 24
End: 2024-08-08
Payer: COMMERCIAL

## 2024-08-08 DIAGNOSIS — F19.90 POLYSUBSTANCE USE DISORDER: Primary | ICD-10-CM

## 2024-08-08 PROCEDURE — H0015 ALCOHOL AND/OR DRUG SERVICES: HCPCS | Performed by: SOCIAL WORKER

## 2024-08-08 NOTE — PROGRESS NOTES
"CD IOP Group note  Observations:    Engaged in Activity / Process and Self -disclosed: Yes  Applies Topic to self: Yes  Able to give Constructive Feedback: Yes  Affect: anxious  Degree of Insightful Thinking 5  Notes:  5023-3105  Pt processed that her parents think she is choosing not to quit ETOH. \"They think I am being mean, defiant and perhaps do not love them because I can't just quit. Invited pt to bring her parents to family day in this IOP. Pt will ask them Offered to help pt by calling her parents and inviting if pt anxious about ask. Pt has \"no\" SI.      Danielle Yoon, ADELSOW            "

## 2024-08-08 NOTE — PROGRESS NOTES
Other     Information/activity     3710-9055 Anger and the Brain - Gave the handouts Anger Inventory; what's you anger style; Anger styles; Time Out; and Pet scan of Depressed and Not depressed brain to help discuss the effects of anger on the brain and ways to channel the energy of anger in a positive direction.     In[facility], [facility]     13:39 EDT     Patient Response Good participation  Pt focused on the topic, related to peers and shared of ways to channel the energy using music and dance.

## 2024-08-08 NOTE — PROGRESS NOTES
"CD IOP Group note  Observations:    Engaged in Activity / Process and Self -disclosed: Yes  Applies Topic to self: Yes  Able to give Constructive Feedback: Yes  Affect: anxious  Degree of Insightful Thinking 5  Notes:  3192-8316  Assisted pt with development of and processing her sobriety plan for the weekend. Pt has \"no\" SI.      Danielle Yoon, ADELSOW            "

## 2024-08-08 NOTE — PROGRESS NOTES
IDENTIFYING INFORMATION: The patient is a 24-year-old white female admitted with a history of alcohol dependence    CHIEF COMPLAINT: Alcohol    INFORMANT: Patient and chart    RELIABILITY: Fair    HISTORY OF PRESENT ILLNESS: Patient is a 24-year-old white female admitted with a history of alcohol dependence.  She reports that prior to admission she was drinking up to 1/5 of hard liquor on a daily basis.  .  She also reports that she uses a cannabis and vapor device.  The patient denies prior chemical dependence treatment, but is treated for attention deficit disorder with a psychostimulant medication she does complain of some alcohol craving as well as insomnia.  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  She denies current suicidal homicidal or psychotic features.  She denies any prior history of suicide attempts or gestures.    PAST PSYCHIATRIC HISTORY: None reported    PAST MEDICAL HISTORY: Noncontributory    MEDICATIONS: The patient takes a psychostimulant for ADD    ALLERGIES: Peanuts and tree nuts    FAMILY HISTORY: There is a positive family history of alcohol abuse    SOCIAL HISTORY: The patient reports that she was drinking up to 1/5 of hard liquor on a daily basis prior to admission.  She lives with her parents.  In addition to alcohol, she also uses a cannabis vague device.  Field    MENTAL STATUS EXAM: Patient is a well-developed well-nourished white female appearing her stated age.  She has no apparent physical distress at the time examination.  She is awake alert and oriented spheres.  Her mood is euthymic her affect congruent.  Speech is generally relevant and coherent.  There no gross deficits in memory or cognition noted intelligence is judged to be in the average range based on fund of knowledge, the patient is cooperative throughout the interview.  She denies current suicidal or homicidal ideations or psychotic features.  Judgment and insight are intact.    ASSETS/LIABILITIES:  Motivation for change/none    DIAGNOSTIC IMPRESSION: Alcohol use disorder, attention deficit disorder by history    PLAN: The patient will be started on acamprosate 333 mg 2 tablets 3 times daily to address alcohol craving and I will add trazodone 50 mg at at bedtime as needed insomnia.

## 2024-08-09 ENCOUNTER — APPOINTMENT (OUTPATIENT)
Dept: PSYCHIATRY | Facility: HOSPITAL | Age: 24
End: 2024-08-09
Payer: COMMERCIAL

## 2024-08-12 ENCOUNTER — OFFICE VISIT (OUTPATIENT)
Dept: PSYCHIATRY | Facility: HOSPITAL | Age: 24
End: 2024-08-12
Payer: COMMERCIAL

## 2024-08-12 DIAGNOSIS — F19.90 POLYSUBSTANCE USE DISORDER: Primary | ICD-10-CM

## 2024-08-12 PROCEDURE — H0015 ALCOHOL AND/OR DRUG SERVICES: HCPCS | Performed by: SOCIAL WORKER

## 2024-08-12 NOTE — PROGRESS NOTES
Other     Information/activity     6705-1776 BRAVING Trust - Watched the videos The lesson Aviva Grier's deughter learned about trust/SuperSoul sessions and Anatomy of trust (abridged); and read aloud the handout BRAVING - The anatomy of trust by Aviva Grier; processed with the group    In[facility], [facility]     11:47 EDT     Patient Response Good participation  Pt focused on the topic, helped to read the handout aloud and related to the material and peers.

## 2024-08-12 NOTE — PROGRESS NOTES
"CD IOP Group note  Observations:[id number]    Engaged in Activity / Process and Self -disclosed: Yes  Applies Topic to self: Yes  Able to give Constructive Feedback: Yes  Affect: anxious  Degree of Insightful Thinking 5  Notes:  Pt processed gratitude that she is able to ambulate easier. Pt processed gratitude for being able to go to [employer]. \"I have not been able to go anywhere until then\" \"It was great to go shopping and I bought a new calendar book too\" Pt participated fully in morning meditation. Pt has \"no\" SI.    Danielle Yoon, LCSW            "

## 2024-08-13 ENCOUNTER — OFFICE VISIT (OUTPATIENT)
Dept: PSYCHIATRY | Facility: HOSPITAL | Age: 24
End: 2024-08-13
Payer: COMMERCIAL

## 2024-08-13 DIAGNOSIS — F19.90 POLYSUBSTANCE USE DISORDER: Primary | ICD-10-CM

## 2024-08-13 PROCEDURE — H0015 ALCOHOL AND/OR DRUG SERVICES: HCPCS | Performed by: SOCIAL WORKER

## 2024-08-13 NOTE — PROGRESS NOTES
"CD IOP Group note  Observations:    Engaged in Activity / Process and Self -disclosed: Yes  Applies Topic to self: Yes  Able to give Constructive Feedback: Yes  Affect: anxious  Degree of Insightful Thinking 5  Notes:  6357-0443  Pt processed anxiety concerning her inability to sleep. Pt states she goes to bed and takes pt long time to go to sleep and then broken sleep. Discussed sleep hygiene. Pt processed hope for recovery. Pt working with dance team at school pt teaches at. Pt has high stress level. Suggested FMLA. Pt will take under submission. Pt has \"no\" SI.      Danielle Yoon LCSW            "

## 2024-08-13 NOTE — PROGRESS NOTES
"CD IOP Group note  Observations:    Engaged in Activity / Process and Self -disclosed: Yes  Applies Topic to self: Yes  Able to give Constructive Feedback: Yes  Affect: sad and anxious  Degree of Insightful Thinking 5  Notes:  5675-5263  Pt processed anxiety and sadness. Pt processed tearfully her relationship with her parents concerning the disease of KENDELL. \"My parents feel it is something I keep doing because I just want to\" \"They keep telling me all I have to do is quit\" \"They think I am just not willing to quit when I could at any time. Pt inviting her parents to family programming tomorrow with pt. Pt has \"no\" ANTONINA.      Danielle Yoon, YADY            "

## 2024-08-13 NOTE — PROGRESS NOTES
Teaching Record:  Information / activity:  Expressive Therapy  0400-0923 Art Therapy - Mindfulness Watercolor Painting - Used watercolor paints on paper to practice being in the moment by putting paint on the paper for 30 minutes practicing mindfulness and paying attention to thoughts. Processed with the group.    Good participation  Pt focused on the task, shared the created images with peers and stated not knowing what was thinking or creating. Pt was fluid with orientation of the image and accepted peer suggestions of meanings. Voiced enjoyment of the mindfulness.      Cole Hyatt LPAT

## 2024-08-13 NOTE — PROGRESS NOTES
"CD IOP Group note  Observations:    Engaged in Activity / Process and Self -disclosed: Yes  Applies Topic to self: Yes  Able to give Constructive Feedback: Yes  Affect: anxious  Degree of Insightful Thinking 5  Notes:  8658-2466  Pt completed worksheet on styles of anger and processed. Pt has \"no\" ANTONINA.      Danielle Yoon, ADELSOW            "

## 2024-08-14 ENCOUNTER — OFFICE VISIT (OUTPATIENT)
Dept: PSYCHIATRY | Facility: HOSPITAL | Age: 24
End: 2024-08-14
Payer: COMMERCIAL

## 2024-08-14 DIAGNOSIS — F19.90 POLYSUBSTANCE USE DISORDER: Primary | ICD-10-CM

## 2024-08-14 PROCEDURE — H0015 ALCOHOL AND/OR DRUG SERVICES: HCPCS | Performed by: SOCIAL WORKER

## 2024-08-14 NOTE — PLAN OF CARE
"Multidisciplinary team met to review pt plan of care:  Pt has attended 7 KENDELL IOP sessions. Pt processing anxiety and fear of relapse. Pt brought her parents to family programming. Pt also brought a friend to family programming Pt is taking Revia and acamprosate.  Pt has \"no\" SI.        Patient/Guardian Signature: __________________________________            Psychiatrist Signature: ______________________________________             Therapist Signature: ________________________________________         Nurse Signature: ___________________________________________          Staff Signature: ____________________________________________            Staff Signature: ____________________________________________          Staff Signature: ____________________________________________          Staff Signature:                                                                                                   "

## 2024-08-14 NOTE — PROGRESS NOTES
"CD IOP Group note  Observations:    Engaged in Activity / Process and Self -disclosed: Yes  Applies Topic to self: Yes  Able to give Constructive Feedback: Yes  Affect: anxious  Degree of Insightful Thinking 5  Notes:  8006-5529  Pt processed remorse forher dishonesty during active KENDELL. Pt has \"no\" SI      Danielle Yoon LCSW            "

## 2024-08-14 NOTE — PROGRESS NOTES
Teaching Record:  Information / activity:  Stages of Family Recovery - Family Program    Good participation   4249-0201      Danielle Yoon, LCSW

## 2024-08-14 NOTE — PROGRESS NOTES
"CD IOP Group note  Observations:    Engaged in Activity / Process and Self -disclosed: Yes  Applies Topic to self: Yes  Able to give Constructive Feedback: Yes  Affect: anxious  Degree of Insightful Thinking 6  Notes:  9522-6363  Pt brought her parents to family program today. Referred parents to [facility] and provided education about 12 step work and sponsorship in [facility]. Provided [facility] meeting directory. Pt processed anxiety and feeling \"In a fog\" Educated on acute withdrawal and post acute withdrawal syndrome (PAWS)  Pt has no SI.      Danielle Yoon, ADELSOW            "

## 2024-08-15 ENCOUNTER — OFFICE VISIT (OUTPATIENT)
Dept: PSYCHIATRY | Facility: HOSPITAL | Age: 24
End: 2024-08-15
Payer: COMMERCIAL

## 2024-08-15 DIAGNOSIS — F10.20 UNCOMPLICATED ALCOHOL DEPENDENCE: Primary | ICD-10-CM

## 2024-08-15 PROCEDURE — H0015 ALCOHOL AND/OR DRUG SERVICES: HCPCS | Performed by: SOCIAL WORKER

## 2024-08-15 NOTE — PROGRESS NOTES
Teaching Record:  Information / activity:  Overview of the 12 Steps and Process Addictions    Good participation  0852-4374      ADELSO SanchezW

## 2024-08-15 NOTE — PROGRESS NOTES
Other     Information/activity     7415-0584 Mindfulness - Watched the video The Power of Mindfulness: What you Practice Grows Stronger - with Nadja Talley  ; processed with the group    Instructor Cole Hyatt LPAT     11:41 EDT     Patient Response Good participation  Pt focused on the video, shared ways to practice recovery and related to peers.

## 2024-08-15 NOTE — PROGRESS NOTES
"CD IOP Group note  Observations:  3996-6069  Engaged in Activity / Process and Self -disclosed: Yes  Applies Topic to self: Yes  Able to give Constructive Feedback: Yes  Affect: anxious and bright  Degree of Insightful Thinking 5  Notes:  Pt processed anxiety. Assisted pt with development of and processing weekend sobriety plan.   Pt has \"no\" ANTONINA Yoon LCSW            "

## 2024-08-16 ENCOUNTER — APPOINTMENT (OUTPATIENT)
Dept: PSYCHIATRY | Facility: HOSPITAL | Age: 24
End: 2024-08-16
Payer: COMMERCIAL

## 2024-08-19 ENCOUNTER — OFFICE VISIT (OUTPATIENT)
Dept: PSYCHIATRY | Facility: HOSPITAL | Age: 24
End: 2024-08-19
Payer: COMMERCIAL

## 2024-08-19 DIAGNOSIS — F19.90 POLYSUBSTANCE USE DISORDER: Primary | ICD-10-CM

## 2024-08-19 PROCEDURE — H0015 ALCOHOL AND/OR DRUG SERVICES: HCPCS | Performed by: SOCIAL WORKER

## 2024-08-19 NOTE — PROGRESS NOTES
Teaching Record:  Information / activity:  Anonymous People Documentary    Good participation      Danielle Yoon, [facility]

## 2024-08-19 NOTE — PROGRESS NOTES
"CD IOP Group note  Observations:    Engaged in Activity / Process and Self -disclosed: Yes  Applies Topic to self: Yes  Able to give Constructive Feedback: Yes  Affect: angry and anxious  Degree of Insightful Thinking 5  Notes:  pt processed anxiety and anger.\"I am feeling angry in general not about something I am angry about\" My anxiety is anger producting\"  Pt processed triggers and cravings. Pt has \"no\" SI.      Danielle Yoon, LCSW            "

## 2024-08-19 NOTE — PROGRESS NOTES
"CD IOP Group note  Observations:    Engaged in Activity / Process and Self -disclosed: Yes  Applies Topic to self: Yes  Able to give Constructive Feedback: Yes  Affect: anxious  Degree of Insightful Thinking 5  Notes:  Assisted pt with development of and processing pt sobriety plan for today. Pt has \"no\" DEVONTE Yoon LCSW            "

## 2024-08-20 ENCOUNTER — OFFICE VISIT (OUTPATIENT)
Dept: PSYCHIATRY | Facility: HOSPITAL | Age: 24
End: 2024-08-20
Payer: COMMERCIAL

## 2024-08-20 DIAGNOSIS — F19.90 POLYSUBSTANCE USE DISORDER: Primary | ICD-10-CM

## 2024-08-20 PROCEDURE — H0015 ALCOHOL AND/OR DRUG SERVICES: HCPCS | Performed by: SOCIAL WORKER

## 2024-08-20 NOTE — PROGRESS NOTES
"CD IOP Group note  Observations:    Engaged in Activity / Process and Self -disclosed: Yes  Applies Topic to self: Yes  Able to give Constructive Feedback: Yes  Affect: anxious  Degree of Insightful Thinking 5  Notes:  9708-2578  Pt processed anxiety and frustration. Pt processed anxiety increased and a general feeling of anodinia. \"Feeling just blah no real happy nor sad just existing feels like at times\". Discussed/educated pt on acute withdrawal and Post Acute Withdrawal stages of recovery. This time in KENDELL IOP is transitioning to living a clean and sober life. Pt stress is primary work. Pt has \"no\" SI       Danielle Yoon LCSW            "

## 2024-08-20 NOTE — PROGRESS NOTES
Teaching Record:  Information / activity:  Planning for Sobriety  5503-9113    Good participation      Danielle Yoon, LCSW

## 2024-08-20 NOTE — PROGRESS NOTES
Teaching Record:  Information / activity:  Expressive Therapy  7893-0107 Art Therapy - The Wall - Had the therapeutic story The Wall read aloud; used markers on paper to create an image of own wall; and then processed with the group    Good participation  Pt read the story to the group, shared own created image and described recent changes since not drinking ETOH      Cole Hyatt, LPAT

## 2024-08-21 ENCOUNTER — OFFICE VISIT (OUTPATIENT)
Dept: PSYCHIATRY | Facility: HOSPITAL | Age: 24
End: 2024-08-21
Payer: COMMERCIAL

## 2024-08-21 DIAGNOSIS — F19.90 POLYSUBSTANCE USE DISORDER: Primary | ICD-10-CM

## 2024-08-21 PROCEDURE — H0015 ALCOHOL AND/OR DRUG SERVICES: HCPCS | Performed by: SOCIAL WORKER

## 2024-08-21 NOTE — PROGRESS NOTES
Teaching Record:  Information / activity:  Growing up in an Alcoholic Family documentary    Good participation   3961-2831      Danielle Yoon, LCSW

## 2024-08-21 NOTE — PROGRESS NOTES
"CD IOP Group note  Observations:    Engaged in Activity / Process and Self -disclosed: Yes  Applies Topic to self: Yes  Able to give Constructive Feedback: Yes  Affect: anxious  Degree of Insightful Thinking 6  Notes:  4351-9176  Assisted pt with development of and processing pt sobriety plan for today. Pt has \"no\" SI.      Danielle Yoon, ADELSOW            "

## 2024-08-21 NOTE — PROGRESS NOTES
"CD IOP Group note  Observations:    Engaged in Activity / Process and Self -disclosed: Yes  Applies Topic to self: Yes  Able to give Constructive Feedback: Yes  Affect:   Degree of Insightful Thinking 5  Notes:  1275-8350  Pt brought her mother to family programming today. Pt mother processed childhood being raised in a addicted system. Pt processed anxiety Pt has \"no\" SI      Danielle Yoon, LCSMIRELLA            "

## 2024-08-22 ENCOUNTER — OFFICE VISIT (OUTPATIENT)
Dept: PSYCHIATRY | Facility: HOSPITAL | Age: 24
End: 2024-08-22
Payer: COMMERCIAL

## 2024-08-22 DIAGNOSIS — F10.20 UNCOMPLICATED ALCOHOL DEPENDENCE: Primary | ICD-10-CM

## 2024-08-22 PROCEDURE — H0015 ALCOHOL AND/OR DRUG SERVICES: HCPCS | Performed by: SOCIAL WORKER

## 2024-08-22 NOTE — PROGRESS NOTES
Teaching Record:  Information / activity:  Planning for Sobriety    Good participation   3347-0868      Danielle Yoon, LCSW

## 2024-08-22 NOTE — PROGRESS NOTES
"CD IOP Group note  Observations:    Engaged in Activity / Process and Self -disclosed: Yes  Applies Topic to self: Yes  Able to give Constructive Feedback: Yes  Affect: anxious  Degree of Insightful Thinking 5  Notes:  5242-6878  Pt processed high anxiety. Pt supported and encouraged and supported by ana cristina edwards and peers in group.  Assisted pt with development of and processing her weekend sobriety plan  Pt has \"no\" SI.      Danielle Yoon, ADELSOW            "

## 2024-08-22 NOTE — PROGRESS NOTES
"CD IOP Group note  Observations:    Engaged in Activity / Process and Self -disclosed: Yes  Applies Topic to self: Yes  Able to give Constructive Feedback: Yes  Affect: anxious and bright  Degree of Insightful Thinking 5  Notes:  2699-9734  Pt processed feeling rejected from her parents. \"They still think KENDELL is as easy to fix as just stop drinking and I am trying to hurt them because I could not stop drinking when I was drinking 19 shots a day\"   Pt processed childhood trauma and hope for recovery. Pt has \"no\" DEVONTE Yoon, LCSW            "

## 2024-08-26 ENCOUNTER — OFFICE VISIT (OUTPATIENT)
Dept: PSYCHIATRY | Facility: HOSPITAL | Age: 24
End: 2024-08-26
Payer: COMMERCIAL

## 2024-08-26 DIAGNOSIS — F19.90 POLYSUBSTANCE USE DISORDER: Primary | ICD-10-CM

## 2024-08-26 PROCEDURE — H0015 ALCOHOL AND/OR DRUG SERVICES: HCPCS | Performed by: SOCIAL WORKER

## 2024-08-26 NOTE — PROGRESS NOTES
"CD IOP Group note  Observations:    Engaged in Activity / Process and Self -disclosed: Yes  Applies Topic to self: Yes  Able to give Constructive Feedback: Yes  Affect: anxious  Degree of Insightful Thinking 5  Notes:  6878-9527  Pt processed anxiety and fear. Pt fearful about losing her license when she goes to court tomorrow for speeding tickets she was given.  Pt processed high family conflict with pt mom and father. Pt would like to get her own place ASAP. Pt looking at apartments today. Pt has \"no\" SI.      Danielle Yoon LCSW            "

## 2024-08-26 NOTE — PROGRESS NOTES
Teaching Record:  Information / activity:  Spirituality and Planning for Sobriety    Good participation   0116-0217      Danielle Yoon, LCSW

## 2024-08-26 NOTE — PROGRESS NOTES
"CD IOP Group note  Observations:    Engaged in Activity / Process and Self -disclosed: Yes  Applies Topic to self: Yes  Able to give Constructive Feedback: Yes  Affect: tearful  Degree of Insightful Thinking 6  Notes:  2160-0581  Pt processed sadness that she and her parents have always had conflicts. Assisted pt with formulation of and processing her sobriety plan for today.   Pt has \"no\" SI.      Danielle Yoon, YADY            "

## 2024-08-27 ENCOUNTER — OFFICE VISIT (OUTPATIENT)
Dept: PSYCHIATRY | Facility: HOSPITAL | Age: 24
End: 2024-08-27
Payer: COMMERCIAL

## 2024-08-27 DIAGNOSIS — F19.90 POLYSUBSTANCE USE DISORDER: Primary | ICD-10-CM

## 2024-08-27 PROCEDURE — H0015 ALCOHOL AND/OR DRUG SERVICES: HCPCS | Performed by: SOCIAL WORKER

## 2024-08-28 ENCOUNTER — OFFICE VISIT (OUTPATIENT)
Dept: PSYCHIATRY | Facility: HOSPITAL | Age: 24
End: 2024-08-28
Payer: COMMERCIAL

## 2024-08-28 DIAGNOSIS — F19.90 POLYSUBSTANCE USE DISORDER: Primary | ICD-10-CM

## 2024-08-28 PROCEDURE — H0015 ALCOHOL AND/OR DRUG SERVICES: HCPCS | Performed by: SOCIAL WORKER

## 2024-08-28 NOTE — PROGRESS NOTES
Teaching Record:  Information / activity:  Roadmap for Recovery - Family Program    Good participation               9144-7525      Danielle Yoon, ADELSOW

## 2024-08-28 NOTE — PLAN OF CARE
"Multidisplinary team met to review pt plan of care:  Pt has attended 14 KENDELL IOP sessions   Pt boyfriend and her father have participated in family day programming with pt. Pt boyfriend stopped using any type of psychoactive substances when pt stopped. Pt reports anxiety and craving for ETOH and MJ. Pt has \"no\" SI.  "

## 2024-08-28 NOTE — PLAN OF CARE
"Multidisciplinary team met to review pt plan of care:  Pt has attended  IOP sessions. She attends AA and has obtained an AA sponsor. Pt processing triggers and cravings. Pt boyfriend attends IOP with pt on family day treatment. Pt has \"no\" SI  "

## 2024-08-28 NOTE — PROGRESS NOTES
"CD IOP Group note  Observations:    Engaged in Activity / Process and Self -disclosed: Yes  Applies Topic to self: Yes  Able to give Constructive Feedback: Yes  Affect: anxious  Degree of Insightful Thinking 5  Notes:  7863-6037   Assisted pt with formulation and processing her sobriety plan for today. Pt has \"no\" SI.      Danielle Yoon, YADY            "

## 2024-08-28 NOTE — PROGRESS NOTES
Teaching Record:  Information / activity:  Roadmap for Recovery - Family Program    Good participation   4774-9473      Danielle Yoon, ADELSOW

## 2024-08-28 NOTE — PLAN OF CARE
"Addendum:  Please disregard last plan of care entry 8-28-24  as it is information of another pt.  I could not delete:    Correction:  Pt has attended 14 KENDELL IOP sessions. Pt taking Revia, Acamprosate MAT  Pt processing triggers and some short lived cravings. Pt has brought her mother and father to KENDELL IOP family day  They have been referred to [facility]  Pt \"no\" SI.  "

## 2024-08-28 NOTE — PROGRESS NOTES
"CD IOP Group note  Observations:    Engaged in Activity / Process and Self -disclosed: Yes  Applies Topic to self: Yes  Able to give Constructive Feedback: Yes  Affect: anxious  Degree of Insightful Thinking 5  Notes:  0003-2548  Assisted pt with formulation of and processing pt sobriety plan for today. Pt has \"no\" SI.      Danielle Yoon, ADELSOW            "

## 2024-08-28 NOTE — PROGRESS NOTES
"CD IOP Group note  Observations:    Engaged in Activity / Process and Self -disclosed: Yes  Applies Topic to self: Yes  Able to give Constructive Feedback: Yes  Affect: anxious  Degree of Insightful Thinking 6  Notes:  5660-2844  Pt brought her father to KENDE[facility] IOP today. Pt processed remorse for her dishonesty during her active KENDELL. Pt has \"no\" SI.      Danielle Yoon, YADY            "

## 2024-08-29 ENCOUNTER — OFFICE VISIT (OUTPATIENT)
Dept: PSYCHIATRY | Facility: HOSPITAL | Age: 24
End: 2024-08-29
Payer: COMMERCIAL

## 2024-08-29 DIAGNOSIS — F19.90 POLYSUBSTANCE USE DISORDER: Primary | ICD-10-CM

## 2024-08-29 PROCEDURE — H0015 ALCOHOL AND/OR DRUG SERVICES: HCPCS | Performed by: SOCIAL WORKER

## 2024-08-29 NOTE — PROGRESS NOTES
"CD IOP Group note  Observations:    Engaged in Activity / Process and Self -disclosed: Yes  Applies Topic to self: Yes  Able to give Constructive Feedback: Yes  Affect: anxious  Degree of Insightful Thinking 5  Notes:  7560-2445  Pt processed having her license suspended aand worry about how to get around. Pt parents help. Offered LYFT to and from KENDELL IOP.   Pt processed grief over loss of her license for 3 months. Pt has \"no\" ANTONINA Yoon, YADY            "

## 2024-08-29 NOTE — PROGRESS NOTES
S: The patient is in good spirits when seen today and is tolerating medications without complaint.  She is maintaining sobriety.  She is tolerating acamprosate without complaint and is sleeping well with trazodone.    O: Alert and oriented in all spheres.  Mood euthymic affect incurrent.  No suicidal or homicidal ideation psychotic features.  Judgement and insight intact.    A: Alcohol use disorder, attention deficit disorder by history    P: Continuing current management.

## 2024-08-30 ENCOUNTER — APPOINTMENT (OUTPATIENT)
Dept: PSYCHIATRY | Facility: HOSPITAL | Age: 24
End: 2024-08-30
Payer: COMMERCIAL

## 2024-09-03 ENCOUNTER — TELEPHONE (OUTPATIENT)
Dept: PSYCHIATRY | Facility: HOSPITAL | Age: 24
End: 2024-09-03
Payer: COMMERCIAL

## 2024-09-03 NOTE — TELEPHONE ENCOUNTER
Called pt and pt stated not feeling well today and hopes to feel better and attend KENDELL IOP Wednesday, September 4.

## 2024-09-04 ENCOUNTER — OFFICE VISIT (OUTPATIENT)
Dept: PSYCHIATRY | Facility: HOSPITAL | Age: 24
End: 2024-09-04
Payer: COMMERCIAL

## 2024-09-04 DIAGNOSIS — F10.20 UNCOMPLICATED ALCOHOL DEPENDENCE: ICD-10-CM

## 2024-09-04 DIAGNOSIS — F19.90 POLYSUBSTANCE USE DISORDER: Primary | ICD-10-CM

## 2024-09-04 PROCEDURE — H0015 ALCOHOL AND/OR DRUG SERVICES: HCPCS | Performed by: ART THERAPIST

## 2024-09-04 NOTE — PROGRESS NOTES
"CD IOP Group note  Observations:    Engaged in Activity / Process and Self -disclosed: Yes  Applies Topic to self: Yes  Able to give Constructive Feedback: Yes  Affect: blunted and bright  Degree of Insightful Thinking 5  Notes:  Pt attended the session with mother \"Eva\" and was open in interactions with peers. Pt shared in the Chat Pack exercise and described own interests as related to peers. Pt voiced feeling better since starting the KENDELL IOP not only mentally, but also physically and in having more structure in day. Eva interacted easily also and shared interests and encouraged peers to attend [facility] meetings. Eva described feeling nervous the first time attended the meetings and then was comfortable with being there. Pt described feeling good about interacting today. Denied SI. Plans to return to KENDE[facility] IOP Thursday, September 5.      NATAN Ratliff            "

## 2024-09-04 NOTE — PROGRESS NOTES
Mood at 2 with 10 being the worse and shared the group interaction was most helpful today.    Symptoms include:    Trouble with concentration, memory, or making decisions  Increased Irritabliliy  Easily distracted  Changes in normal sleep patterns (increase, decrease, or broken hoours of sleep)  Racing thoughts    Denied SI.  Shared appropriate goals including getting 30 day chip. Plans to return to [facility] Thursday, September 5.

## 2024-09-05 ENCOUNTER — OFFICE VISIT (OUTPATIENT)
Dept: PSYCHIATRY | Facility: HOSPITAL | Age: 24
End: 2024-09-05
Payer: COMMERCIAL

## 2024-09-05 DIAGNOSIS — F19.90 POLYSUBSTANCE USE DISORDER: Primary | ICD-10-CM

## 2024-09-05 DIAGNOSIS — F10.20 UNCOMPLICATED ALCOHOL DEPENDENCE: ICD-10-CM

## 2024-09-05 PROCEDURE — H0015 ALCOHOL AND/OR DRUG SERVICES: HCPCS | Performed by: ART THERAPIST

## 2024-09-05 NOTE — PROGRESS NOTES
S: The patient is improved today.  Her mood is stable but she does complain of some ongoing poor sleep.    O: Awake alert and oriented in all spheres.  Mood euthymic affect congruent.  Speech relevant and coherent.  No deficits memory or cognition noted.  Intelligence is judged to be in the average range based on fund of knowledge, the patient is cooperative interview.  No suicidal homicidal or psychotic features.  Judgement and insight intact    A: Depressive disorder recurrent moderate    P: Continue current treatment.  We will increase the patient's dose of trazodone to 50 mg 1-2 at bedtime as needed insomnia.

## 2024-09-05 NOTE — PROGRESS NOTES
Mood at 1-2 with 10 being the worse and shared the art therapy task was most helpful today.    Symptoms include:    Easily distracted  Changes in normal sleep patterns (increase, decrease, or broken hoours of sleep)  Racing thoughts    Denied SI.  Shared appropriate goals. Plans to return to [facility] Friday, September 6.

## 2024-09-05 NOTE — PROGRESS NOTES
Other     Information/activity     [id number]  Art Therapy - Had the therapeutic story The Morning Glory and The Storm; Used markers on paper to create an image of how related to or what stood out from the story.    Instructor Cole Hyatt LPAT     12:46 EDT     Patient Response Good participation  Pt focused on the task, voiced enjoyment of the art materials and related to peers. Pt reinterpreted the story to relate to an picture of a candle at home. Pt accepted peer support and feedback.

## 2024-09-05 NOTE — PLAN OF CARE
"  Multidisciplinary discipline team met on 9/4/2024 to review pt plan of care:  Pt has attended 17 [facility] sessions. Pt has brought mother to the family day in [facility]. Pt shared regular contact with sponsor.Pt is looking forward to receive a 30 day chip in AA today. Pt processing cravings for ETOH. Pt has \"no\" SI.        "

## 2024-09-06 ENCOUNTER — OFFICE VISIT (OUTPATIENT)
Dept: PSYCHIATRY | Facility: HOSPITAL | Age: 24
End: 2024-09-06
Payer: COMMERCIAL

## 2024-09-06 DIAGNOSIS — F19.90 POLYSUBSTANCE USE DISORDER: Primary | ICD-10-CM

## 2024-09-06 PROCEDURE — H0015 ALCOHOL AND/OR DRUG SERVICES: HCPCS | Performed by: SOCIAL WORKER

## 2024-09-06 NOTE — PROGRESS NOTES
Mood at 2 with 10 being the worse and shared the interactions in group was most helpful today.    Symptoms include:    Trouble with concentration, memory, or making decisions  An increase/decrease in normal appetite  Changes in normal sleep patterns (increase, decrease, or broken hoours of sleep)    Denied SI.  Shared appropriate goals. Plans to return to [facility] Monday, September 9.

## 2024-09-06 NOTE — PROGRESS NOTES
Other     Information/activity     [id number] Self-Esteem/Self-Compassion - Art Therapy - Discussed self-esteem combined with self-compassion; had the handout My Declaration of Self-Esteem read aloud; used markers on paper to create a card to self about self-compassion; shared with the group    Instructor NATAN Ratliff     11:04 EDT     Patient Response Good participation  Pt focused on the task, shared created card to self and related to peers

## 2024-09-06 NOTE — PROGRESS NOTES
Psych IOP  Group note  Observations:    Engaged in Activity / Process and Self -disclosed: Yes  Applies Topic to self: Yes  Able to give Constructive Feedback: Yes  Affect:  appropriate to situation   Degree of Insightful Thinking 7  Notes:  Pt engaged in group. Pt attentive to peers and related to discussion around symptoms of depression and lack of motivation to get out of bed sometimes. Pt shared that it is helpful for her to put positive things around her bed so it is the first thing she sees when she gets up. Pt also noted use of a star sheet so she can reward herself daily for wins and progress made towards goals. Pt enthusiastic to share this and interacted well with peers. Pt receptive to positive feedback.       Ashley Poole LCSW

## 2024-09-06 NOTE — PROGRESS NOTES
Psych IOP  Group note  Observations:    Engaged in Activity / Process and Self -disclosed: Yes  Applies Topic to self: Yes  Able to give Constructive Feedback: Yes  Affect:  appropriate to situation   Degree of Insightful Thinking 6  Notes:  Pt engaged in group. Pt related to discussion about family dynamics and shared of conflictual relationship with she and her parents. Pt processed being able to manage feelings about this better in her sobriety and working to gain more independence. Pt encouraging of peers and offered ideas on ways to cope with depression. Pt planning to be with friends, go to meetings, and see her sponsor this weekend.       ADELSO VenturaW

## 2024-09-09 ENCOUNTER — HOSPITAL ENCOUNTER (EMERGENCY)
Facility: HOSPITAL | Age: 24
Discharge: HOME | End: 2024-09-10
Attending: EMERGENCY MEDICINE
Payer: COMMERCIAL

## 2024-09-09 ENCOUNTER — APPOINTMENT (OUTPATIENT)
Dept: GENERAL RADIOLOGY | Facility: HOSPITAL | Age: 24
End: 2024-09-09
Payer: COMMERCIAL

## 2024-09-09 ENCOUNTER — APPOINTMENT (OUTPATIENT)
Dept: CT IMAGING | Facility: HOSPITAL | Age: 24
End: 2024-09-09
Payer: COMMERCIAL

## 2024-09-09 DIAGNOSIS — F10.220 ALCOHOL DEPENDENCE WITH UNCOMPLICATED INTOXICATION: ICD-10-CM

## 2024-09-09 DIAGNOSIS — S09.90XA CHI (CLOSED HEAD INJURY), INITIAL ENCOUNTER: Primary | ICD-10-CM

## 2024-09-09 DIAGNOSIS — F32.A DEPRESSION, UNSPECIFIED DEPRESSION TYPE: ICD-10-CM

## 2024-09-09 LAB
ALBUMIN SERPL-MCNC: 4.5 G/DL (ref 3.5–5.2)
ALBUMIN/GLOB SERPL: 1.5 G/DL
ALP SERPL-CCNC: 61 U/L (ref 39–117)
ALT SERPL W P-5'-P-CCNC: 53 U/L (ref 1–33)
AMPHET+METHAMPHET UR QL: POSITIVE
ANION GAP SERPL CALCULATED.3IONS-SCNC: 16 MMOL/L (ref 5–15)
APAP SERPL-MCNC: <5 MCG/ML (ref 0–30)
APTT PPP: 26.5 SECONDS (ref 22.7–35.4)
AST SERPL-CCNC: 41 U/L (ref 1–32)
BARBITURATES UR QL SCN: NEGATIVE
BASOPHILS # BLD AUTO: 0.04 10*3/MM3 (ref 0–0.2)
BASOPHILS NFR BLD AUTO: 0.6 % (ref 0–1.5)
BENZODIAZ UR QL SCN: NEGATIVE
BILIRUB SERPL-MCNC: 0.7 MG/DL (ref 0–1.2)
BILIRUB UR QL STRIP: NEGATIVE
BUN SERPL-MCNC: 6 MG/DL (ref 6–20)
BUN/CREAT SERPL: 9 (ref 7–25)
CALCIUM SPEC-SCNC: 9.3 MG/DL (ref 8.6–10.5)
CANNABINOIDS SERPL QL: POSITIVE
CHLORIDE SERPL-SCNC: 103 MMOL/L (ref 98–107)
CLARITY UR: CLEAR
CO2 SERPL-SCNC: 20 MMOL/L (ref 22–29)
COCAINE UR QL: NEGATIVE
COLOR UR: YELLOW
CREAT SERPL-MCNC: 0.67 MG/DL (ref 0.57–1)
DEPRECATED RDW RBC AUTO: 41.6 FL (ref 37–54)
EGFRCR SERPLBLD CKD-EPI 2021: 126.1 ML/MIN/1.73
EOSINOPHIL # BLD AUTO: 0.09 10*3/MM3 (ref 0–0.4)
EOSINOPHIL NFR BLD AUTO: 1.3 % (ref 0.3–6.2)
ERYTHROCYTE [DISTWIDTH] IN BLOOD BY AUTOMATED COUNT: 11.8 % (ref 12.3–15.4)
ETHANOL BLD-MCNC: 302 MG/DL (ref 0–10)
ETHANOL UR QL: 0.3 %
FENTANYL UR-MCNC: NEGATIVE NG/ML
GLOBULIN UR ELPH-MCNC: 3 GM/DL
GLUCOSE SERPL-MCNC: 83 MG/DL (ref 65–99)
GLUCOSE UR STRIP-MCNC: NEGATIVE MG/DL
HCG SERPL QL: NEGATIVE
HCT VFR BLD AUTO: 38.6 % (ref 34–46.6)
HGB BLD-MCNC: 13.3 G/DL (ref 12–15.9)
HGB UR QL STRIP.AUTO: NEGATIVE
IMM GRANULOCYTES # BLD AUTO: 0.03 10*3/MM3 (ref 0–0.05)
IMM GRANULOCYTES NFR BLD AUTO: 0.4 % (ref 0–0.5)
INR PPP: 0.99 (ref 0.9–1.1)
KETONES UR QL STRIP: NEGATIVE
LEUKOCYTE ESTERASE UR QL STRIP.AUTO: NEGATIVE
LIPASE SERPL-CCNC: 52 U/L (ref 13–60)
LYMPHOCYTES # BLD AUTO: 3.37 10*3/MM3 (ref 0.7–3.1)
LYMPHOCYTES NFR BLD AUTO: 49.3 % (ref 19.6–45.3)
MCH RBC QN AUTO: 33.3 PG (ref 26.6–33)
MCHC RBC AUTO-ENTMCNC: 34.5 G/DL (ref 31.5–35.7)
MCV RBC AUTO: 96.5 FL (ref 79–97)
METHADONE UR QL SCN: NEGATIVE
MONOCYTES # BLD AUTO: 0.38 10*3/MM3 (ref 0.1–0.9)
MONOCYTES NFR BLD AUTO: 5.6 % (ref 5–12)
NEUTROPHILS NFR BLD AUTO: 2.93 10*3/MM3 (ref 1.7–7)
NEUTROPHILS NFR BLD AUTO: 42.8 % (ref 42.7–76)
NITRITE UR QL STRIP: NEGATIVE
NRBC BLD AUTO-RTO: 0 /100 WBC (ref 0–0.2)
OPIATES UR QL: NEGATIVE
OXYCODONE UR QL SCN: NEGATIVE
PH UR STRIP.AUTO: 6 [PH] (ref 5–8)
PLATELET # BLD AUTO: 322 10*3/MM3 (ref 140–450)
PMV BLD AUTO: 9.2 FL (ref 6–12)
POTASSIUM SERPL-SCNC: 3.7 MMOL/L (ref 3.5–5.2)
PROT SERPL-MCNC: 7.5 G/DL (ref 6–8.5)
PROT UR QL STRIP: NEGATIVE
PROTHROMBIN TIME: 13.3 SECONDS (ref 11.7–14.2)
RBC # BLD AUTO: 4 10*6/MM3 (ref 3.77–5.28)
SALICYLATES SERPL-MCNC: <0.3 MG/DL
SODIUM SERPL-SCNC: 139 MMOL/L (ref 136–145)
SP GR UR STRIP: <=1.005 (ref 1–1.03)
UROBILINOGEN UR QL STRIP: NORMAL
WBC NRBC COR # BLD AUTO: 6.84 10*3/MM3 (ref 3.4–10.8)

## 2024-09-09 PROCEDURE — 80307 DRUG TEST PRSMV CHEM ANLYZR: CPT | Performed by: NURSE PRACTITIONER

## 2024-09-09 PROCEDURE — 71045 X-RAY EXAM CHEST 1 VIEW: CPT

## 2024-09-09 PROCEDURE — 80053 COMPREHEN METABOLIC PANEL: CPT | Performed by: NURSE PRACTITIONER

## 2024-09-09 PROCEDURE — 96360 HYDRATION IV INFUSION INIT: CPT

## 2024-09-09 PROCEDURE — 82077 ASSAY SPEC XCP UR&BREATH IA: CPT | Performed by: NURSE PRACTITIONER

## 2024-09-09 PROCEDURE — 85025 COMPLETE CBC W/AUTO DIFF WBC: CPT | Performed by: NURSE PRACTITIONER

## 2024-09-09 PROCEDURE — 81003 URINALYSIS AUTO W/O SCOPE: CPT | Performed by: NURSE PRACTITIONER

## 2024-09-09 PROCEDURE — 84703 CHORIONIC GONADOTROPIN ASSAY: CPT | Performed by: NURSE PRACTITIONER

## 2024-09-09 PROCEDURE — 25810000003 LACTATED RINGERS SOLUTION: Performed by: NURSE PRACTITIONER

## 2024-09-09 PROCEDURE — 99285 EMERGENCY DEPT VISIT HI MDM: CPT

## 2024-09-09 PROCEDURE — 80143 DRUG ASSAY ACETAMINOPHEN: CPT | Performed by: NURSE PRACTITIONER

## 2024-09-09 PROCEDURE — 85730 THROMBOPLASTIN TIME PARTIAL: CPT | Performed by: NURSE PRACTITIONER

## 2024-09-09 PROCEDURE — 83690 ASSAY OF LIPASE: CPT | Performed by: NURSE PRACTITIONER

## 2024-09-09 PROCEDURE — 80179 DRUG ASSAY SALICYLATE: CPT | Performed by: NURSE PRACTITIONER

## 2024-09-09 PROCEDURE — 85610 PROTHROMBIN TIME: CPT | Performed by: NURSE PRACTITIONER

## 2024-09-09 RX ORDER — SODIUM CHLORIDE 0.9 % (FLUSH) 0.9 %
10 SYRINGE (ML) INJECTION AS NEEDED
Status: DISCONTINUED | OUTPATIENT
Start: 2024-09-09 | End: 2024-09-10 | Stop reason: HOSPADM

## 2024-09-09 RX ADMIN — SODIUM CHLORIDE, POTASSIUM CHLORIDE, SODIUM LACTATE AND CALCIUM CHLORIDE 1000 ML: 600; 310; 30; 20 INJECTION, SOLUTION INTRAVENOUS at 22:00

## 2024-09-10 ENCOUNTER — APPOINTMENT (OUTPATIENT)
Dept: CT IMAGING | Facility: HOSPITAL | Age: 24
End: 2024-09-10
Payer: COMMERCIAL

## 2024-09-10 VITALS
SYSTOLIC BLOOD PRESSURE: 129 MMHG | HEIGHT: 66 IN | BODY MASS INDEX: 24.91 KG/M2 | HEART RATE: 98 BPM | WEIGHT: 155 LBS | TEMPERATURE: 97.4 F | RESPIRATION RATE: 16 BRPM | OXYGEN SATURATION: 98 % | DIASTOLIC BLOOD PRESSURE: 80 MMHG

## 2024-09-10 LAB
ETHANOL BLD-MCNC: 38 MG/DL (ref 0–10)
ETHANOL UR QL: 0.04 %

## 2024-09-10 PROCEDURE — 96372 THER/PROPH/DIAG INJ SC/IM: CPT

## 2024-09-10 PROCEDURE — 82077 ASSAY SPEC XCP UR&BREATH IA: CPT | Performed by: PHYSICIAN ASSISTANT

## 2024-09-10 PROCEDURE — 25010000002 ZIPRASIDONE MESYLATE PER 10 MG: Performed by: NURSE PRACTITIONER

## 2024-09-10 PROCEDURE — 36415 COLL VENOUS BLD VENIPUNCTURE: CPT

## 2024-09-10 PROCEDURE — 25510000001 IOPAMIDOL PER 1 ML: Performed by: EMERGENCY MEDICINE

## 2024-09-10 PROCEDURE — 74177 CT ABD & PELVIS W/CONTRAST: CPT

## 2024-09-10 PROCEDURE — 70498 CT ANGIOGRAPHY NECK: CPT

## 2024-09-10 PROCEDURE — 90791 PSYCH DIAGNOSTIC EVALUATION: CPT | Performed by: SOCIAL WORKER

## 2024-09-10 PROCEDURE — 70450 CT HEAD/BRAIN W/O DYE: CPT

## 2024-09-10 PROCEDURE — 72125 CT NECK SPINE W/O DYE: CPT

## 2024-09-10 RX ORDER — ZIPRASIDONE MESYLATE 20 MG/ML
10 INJECTION, POWDER, LYOPHILIZED, FOR SOLUTION INTRAMUSCULAR ONCE
Status: COMPLETED | OUTPATIENT
Start: 2024-09-10 | End: 2024-09-10

## 2024-09-10 RX ORDER — NICOTINE 21 MG/24HR
1 PATCH, TRANSDERMAL 24 HOURS TRANSDERMAL
Status: DISCONTINUED | OUTPATIENT
Start: 2024-09-10 | End: 2024-09-10 | Stop reason: HOSPADM

## 2024-09-10 RX ORDER — IOPAMIDOL 755 MG/ML
100 INJECTION, SOLUTION INTRAVASCULAR
Status: COMPLETED | OUTPATIENT
Start: 2024-09-10 | End: 2024-09-10

## 2024-09-10 RX ADMIN — IOPAMIDOL 90 ML: 755 INJECTION, SOLUTION INTRAVENOUS at 03:54

## 2024-09-10 RX ADMIN — ZIPRASIDONE MESYLATE 10 MG: 20 INJECTION, POWDER, LYOPHILIZED, FOR SOLUTION INTRAMUSCULAR at 00:13

## 2024-09-10 RX ADMIN — NICOTINE 1 PATCH: 21 PATCH, EXTENDED RELEASE TRANSDERMAL at 09:43
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
## 2024-09-11 ENCOUNTER — OFFICE VISIT (OUTPATIENT)
Dept: PSYCHIATRY | Facility: HOSPITAL | Age: 24
End: 2024-09-11
Payer: COMMERCIAL

## 2024-09-11 DIAGNOSIS — F19.90 POLYSUBSTANCE USE DISORDER: Primary | ICD-10-CM

## 2024-09-11 PROCEDURE — H0015 ALCOHOL AND/OR DRUG SERVICES: HCPCS | Performed by: SOCIAL WORKER
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
## 2024-09-11 NOTE — PROGRESS NOTES
Teaching Record:  Information / activity:  Stages of Family Recovery - Family Program    Good participation   [id number]      Danielle Yoon, LCSW

## 2024-09-12 ENCOUNTER — OFFICE VISIT (OUTPATIENT)
Dept: PSYCHIATRY | Facility: HOSPITAL | Age: 24
End: 2024-09-12
Payer: COMMERCIAL

## 2024-09-12 DIAGNOSIS — F19.90 POLYSUBSTANCE USE DISORDER: Primary | ICD-10-CM

## 2024-09-12 PROCEDURE — H0015 ALCOHOL AND/OR DRUG SERVICES: HCPCS | Performed by: SOCIAL WORKER

## 2024-09-12 NOTE — PROGRESS NOTES
"[facility] IOP Group note  Observations:    Engaged in Activity / Process and Self -disclosed: Yes  Applies Topic to self: Yes  Able to give Constructive Feedback: Yes  Affect: anxious  Degree of Insightful Thinking 5  Notes:  [id number]  Pt processed drinking last night. Referred pt to higher level of care to residential. Discussed pt going to sober living house and getting the Vivitrol injection and continue the Acamprosate. Pt was taking Naltrexone oral Revia. Pt given name of [facility] treatment facility. Gave pt printed material to call [facility]. Offered to call [facility] with pt who declined. Completed relapse prevention plan.  Pt has \"no ANTONINA Yoon, YADY            "
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
## 2024-09-12 NOTE — PROGRESS NOTES
Teaching Record:  Information / activity:  Anger Mangement and Relationships in Recovery    Good participation  [id number]      Danielle Yoon, LCSW

## 2024-09-12 NOTE — PROGRESS NOTES
"[facility] IOP Group note  Observations:    Engaged in Activity / Process and Self -disclosed: Yes  Applies Topic to self: Yes  Able to give Constructive Feedback: Yes  Affect: anxious  Degree of Insightful Thinking 5  Notes:  [id number]  Pt processed anxiety and strong desire for abstinence. Pt AA sponsor went with pt to pt home to get things for pt.   Assisted pt with development of and processing pt weekend sobriety plan. Pt has \"no\"ANTONINA.      Danielle Yoon, YADY            "

## 2024-09-13 ENCOUNTER — OFFICE VISIT (OUTPATIENT)
Dept: PSYCHIATRY | Facility: HOSPITAL | Age: 24
End: 2024-09-13
Payer: COMMERCIAL

## 2024-09-13 DIAGNOSIS — F19.90 POLYSUBSTANCE USE DISORDER: Primary | ICD-10-CM

## 2024-09-13 PROCEDURE — H0015 ALCOHOL AND/OR DRUG SERVICES: HCPCS | Performed by: SOCIAL WORKER

## 2024-09-13 NOTE — PROGRESS NOTES
Other     Information/activity     [id number] Vulnerability and Shame - Watched the videos The power of vulnerability/Aviva Grier and Listening to shame/Aviva Grier; read the handout Aviva Grier's top 10 rules for self-love    Instructor NATAN Ratliff     11:01 EDT     Patient Response Good participation  Pt was attentive and related to the material

## 2024-09-13 NOTE — PROGRESS NOTES
Psych IOP  Group note  Observations:    Engaged in Activity / Process and Self -disclosed: Yes  Applies Topic to self: Yes  Able to give Constructive Feedback: Yes  Affect:  bright; appropriate to situation   Degree of Insightful Thinking 7  Notes:  Pt engaged in group. Pt related well to peers in discussion around intrusive thinking and shared her own experience. Pt very honest in what she shared and identified it as not being an easy thing to talk or think about. Pt receptive to group support from those that could relate in some way. Pt also related to a peer who shared of losing a friend to suicide and processed her own loss of a friend in this way. Pt interacted well in group today.       Ashley Poole LCSW

## 2024-09-13 NOTE — PROGRESS NOTES
Mood at 2 with 10 being the worse and shared the class and interacting in group therapy was most helpful.    Symptoms include:    Easily distracted  An increase/decrease in normal appetite  Changes in normal sleep patterns (increase, decrease, or broken hoours of sleep)  Feelings of increased guilt  Increased nervous feelings/anxiety    Denied SI.  Shared appropriate goals. Plans to return to [facility] IOP Monday, September 16.

## 2024-09-16 ENCOUNTER — OFFICE VISIT (OUTPATIENT)
Dept: PSYCHIATRY | Facility: HOSPITAL | Age: 24
End: 2024-09-16
Payer: COMMERCIAL

## 2024-09-16 DIAGNOSIS — F19.90 POLYSUBSTANCE USE DISORDER: Primary | ICD-10-CM

## 2024-09-16 PROCEDURE — H0015 ALCOHOL AND/OR DRUG SERVICES: HCPCS | Performed by: SOCIAL WORKER

## 2024-09-17 ENCOUNTER — APPOINTMENT (OUTPATIENT)
Dept: PSYCHIATRY | Facility: HOSPITAL | Age: 24
End: 2024-09-17
Payer: COMMERCIAL

## 2024-09-18 ENCOUNTER — APPOINTMENT (OUTPATIENT)
Dept: PSYCHIATRY | Facility: HOSPITAL | Age: 24
End: 2024-09-18
Payer: COMMERCIAL

## 2024-09-19 ENCOUNTER — APPOINTMENT (OUTPATIENT)
Dept: PSYCHIATRY | Facility: HOSPITAL | Age: 24
End: 2024-09-19
Payer: COMMERCIAL

## 2024-09-20 ENCOUNTER — APPOINTMENT (OUTPATIENT)
Dept: PSYCHIATRY | Facility: HOSPITAL | Age: 24
End: 2024-09-20
Payer: COMMERCIAL

## 2024-09-23 ENCOUNTER — APPOINTMENT (OUTPATIENT)
Dept: PSYCHIATRY | Facility: HOSPITAL | Age: 24
End: 2024-09-23
Payer: COMMERCIAL

## 2024-09-24 ENCOUNTER — APPOINTMENT (OUTPATIENT)
Dept: PSYCHIATRY | Facility: HOSPITAL | Age: 24
End: 2024-09-24
Payer: COMMERCIAL

## 2024-09-25 ENCOUNTER — APPOINTMENT (OUTPATIENT)
Dept: PSYCHIATRY | Facility: HOSPITAL | Age: 24
End: 2024-09-25
Payer: COMMERCIAL

## 2024-09-26 ENCOUNTER — APPOINTMENT (OUTPATIENT)
Dept: PSYCHIATRY | Facility: HOSPITAL | Age: 24
End: 2024-09-26
Payer: COMMERCIAL

## 2024-09-27 ENCOUNTER — APPOINTMENT (OUTPATIENT)
Dept: PSYCHIATRY | Facility: HOSPITAL | Age: 24
End: 2024-09-27
Payer: COMMERCIAL

## 2024-09-30 ENCOUNTER — APPOINTMENT (OUTPATIENT)
Dept: PSYCHIATRY | Facility: HOSPITAL | Age: 24
End: 2024-09-30
Payer: COMMERCIAL

## (undated) DEVICE — SENSR O2 OXIMAX FNGR A/ 18IN NONSTR

## (undated) DEVICE — TUBING, SUCTION, 1/4" X 10', STRAIGHT: Brand: MEDLINE

## (undated) DEVICE — KT ORCA ORCAPOD DISP STRL

## (undated) DEVICE — LN SMPL CO2 SHTRM SD STREAM W/M LUER

## (undated) DEVICE — ADAPT CLN BIOGUARD AIR/H2O DISP

## (undated) DEVICE — MSK ENDO PORT O2 POM ELITE CURAPLEX A/

## (undated) DEVICE — FRCP BX RADJAW4 NDL 2.8 240CM LG OG BX40

## (undated) DEVICE — BLCK/BITE BLOX W/DENTL/RIM W/STRAP 54F